# Patient Record
Sex: MALE | Race: WHITE | NOT HISPANIC OR LATINO | Employment: OTHER | URBAN - METROPOLITAN AREA
[De-identification: names, ages, dates, MRNs, and addresses within clinical notes are randomized per-mention and may not be internally consistent; named-entity substitution may affect disease eponyms.]

---

## 2017-02-24 ENCOUNTER — TRANSFERRED RECORDS (OUTPATIENT)
Dept: HEALTH INFORMATION MANAGEMENT | Facility: CLINIC | Age: 77
End: 2017-02-24

## 2017-04-05 ENCOUNTER — AMBULATORY - GICH (OUTPATIENT)
Dept: RADIOLOGY | Facility: OTHER | Age: 77
End: 2017-04-05

## 2017-04-05 DIAGNOSIS — I48.91 ATRIAL FIBRILLATION (H): ICD-10-CM

## 2017-04-12 ENCOUNTER — MEDICAL CORRESPONDENCE (OUTPATIENT)
Facility: CLINIC | Age: 77
End: 2017-04-12
Payer: COMMERCIAL

## 2017-04-12 ENCOUNTER — HOSPITAL ENCOUNTER (OUTPATIENT)
Dept: RADIOLOGY | Facility: OTHER | Age: 77
End: 2017-04-12

## 2017-04-12 ENCOUNTER — TRANSFERRED RECORDS (OUTPATIENT)
Dept: HEALTH INFORMATION MANAGEMENT | Facility: CLINIC | Age: 77
End: 2017-04-12

## 2017-04-12 DIAGNOSIS — I48.91 ATRIAL FIBRILLATION (H): ICD-10-CM

## 2017-04-12 PROCEDURE — 93306 TTE W/DOPPLER COMPLETE: CPT | Mod: 26 | Performed by: INTERNAL MEDICINE

## 2018-01-25 ENCOUNTER — DOCUMENTATION ONLY (OUTPATIENT)
Dept: FAMILY MEDICINE | Facility: OTHER | Age: 78
End: 2018-01-25

## 2018-01-25 PROBLEM — K57.30 DIVERTICULOSIS OF LARGE INTESTINE: Status: ACTIVE | Noted: 2018-01-25

## 2018-02-20 ENCOUNTER — TRANSFERRED RECORDS (OUTPATIENT)
Dept: HEALTH INFORMATION MANAGEMENT | Facility: CLINIC | Age: 78
End: 2018-02-20

## 2018-05-07 ENCOUNTER — TRANSFERRED RECORDS (OUTPATIENT)
Dept: HEALTH INFORMATION MANAGEMENT | Facility: OTHER | Age: 78
End: 2018-05-07

## 2018-05-11 ENCOUNTER — TRANSFERRED RECORDS (OUTPATIENT)
Dept: HEALTH INFORMATION MANAGEMENT | Facility: CLINIC | Age: 78
End: 2018-05-11

## 2018-07-18 DIAGNOSIS — H66.90 AOM (ACUTE OTITIS MEDIA): Primary | ICD-10-CM

## 2018-07-31 ENCOUNTER — TRANSFERRED RECORDS (OUTPATIENT)
Dept: HEALTH INFORMATION MANAGEMENT | Facility: CLINIC | Age: 78
End: 2018-07-31

## 2018-07-31 RX ORDER — FLUTICASONE PROPIONATE 50 MCG
1 SPRAY, SUSPENSION (ML) NASAL 2 TIMES DAILY
COMMUNITY
End: 2018-08-13

## 2018-07-31 RX ORDER — TRIAMCINOLONE ACETONIDE 1 MG/G
CREAM TOPICAL 2 TIMES DAILY PRN
COMMUNITY

## 2018-07-31 RX ORDER — ECHINACEA PURPUREA EXTRACT 125 MG
1 TABLET ORAL PRN
COMMUNITY

## 2018-07-31 RX ORDER — MULTIPLE VITAMINS W/ MINERALS TAB 9MG-400MCG
1 TAB ORAL DAILY
COMMUNITY

## 2018-08-13 ENCOUNTER — OFFICE VISIT (OUTPATIENT)
Dept: AUDIOLOGY | Facility: OTHER | Age: 78
End: 2018-08-13
Attending: AUDIOLOGIST
Payer: MEDICARE

## 2018-08-13 ENCOUNTER — OFFICE VISIT (OUTPATIENT)
Dept: OTOLARYNGOLOGY | Facility: OTHER | Age: 78
End: 2018-08-13
Attending: AUDIOLOGIST
Payer: MEDICARE

## 2018-08-13 VITALS
DIASTOLIC BLOOD PRESSURE: 64 MMHG | WEIGHT: 204 LBS | OXYGEN SATURATION: 99 % | BODY MASS INDEX: 27.04 KG/M2 | TEMPERATURE: 97.4 F | HEART RATE: 55 BPM | HEIGHT: 73 IN | SYSTOLIC BLOOD PRESSURE: 132 MMHG

## 2018-08-13 DIAGNOSIS — J34.3 NASAL TURBINATE HYPERTROPHY: Primary | ICD-10-CM

## 2018-08-13 DIAGNOSIS — H90.3 SENSORINEURAL HEARING LOSS, BILATERAL: Primary | ICD-10-CM

## 2018-08-13 DIAGNOSIS — H90.3 ASNHL (ASYMMETRICAL SENSORINEURAL HEARING LOSS): ICD-10-CM

## 2018-08-13 DIAGNOSIS — R09.81 NASAL CONGESTION: ICD-10-CM

## 2018-08-13 DIAGNOSIS — J34.89 SINUS MUCOSAL THICKENING: ICD-10-CM

## 2018-08-13 DIAGNOSIS — M54.2 NECK PAIN: ICD-10-CM

## 2018-08-13 PROCEDURE — 92567 TYMPANOMETRY: CPT | Performed by: AUDIOLOGIST

## 2018-08-13 PROCEDURE — G0463 HOSPITAL OUTPT CLINIC VISIT: HCPCS | Mod: 25

## 2018-08-13 PROCEDURE — 31231 NASAL ENDOSCOPY DX: CPT | Performed by: NURSE PRACTITIONER

## 2018-08-13 PROCEDURE — 99203 OFFICE O/P NEW LOW 30 MIN: CPT | Mod: 25 | Performed by: NURSE PRACTITIONER

## 2018-08-13 PROCEDURE — 92557 COMPREHENSIVE HEARING TEST: CPT | Performed by: AUDIOLOGIST

## 2018-08-13 PROCEDURE — 92504 EAR MICROSCOPY EXAMINATION: CPT | Performed by: NURSE PRACTITIONER

## 2018-08-13 RX ORDER — FLUTICASONE PROPIONATE 50 MCG
1 SPRAY, SUSPENSION (ML) NASAL 2 TIMES DAILY
Qty: 1 BOTTLE | Refills: 11 | Status: SHIPPED | OUTPATIENT
Start: 2018-08-13 | End: 2023-11-17

## 2018-08-13 ASSESSMENT — PAIN SCALES - GENERAL: PAINLEVEL: NO PAIN (0)

## 2018-08-13 NOTE — PATIENT INSTRUCTIONS
Complete MRI, physical therapy for neck pain. Use Flonase and Milton Med as directed.  Use high volume milton med saline irrigation.  Use warm distilled water and 2 packets of the salt solution that comes with the bottle, dissolve in bottle up to 240 ml henry.  Irrigate each side of your nose leaning over the sink, using 1/3 to 1/2 the volume of the bottle in each nostril every irrigation.  Irrigate 2-3 times daily and as needed.    Follow up with ENT in 6 to 8 weeks.  Thank you for allowing Barbara Lancaster CNP and our ENT team to participate in your care.  If your medications are too expensive, please give the nurse a call.  We can possibly change this medication.  If you have a scheduling or an appointment question please contact [facility] Unit Coordinator at their direct line [phone].   ALL nursing questions or concerns can be directed to your ENT nurse at: [phone] - Jeff

## 2018-08-13 NOTE — LETTER
8/13/2018         RE: Po Blanton  [address] [facility] [address]        Dear Colleague,    Thank you for referring your patient, Po Blanton, to the [facility]. Please see a copy of my visit note below.    Otolaryngology Note         Chief Complaint:     Patient presents with:  Consult: recurrent OM- T. Elida          History of Present Illness:     Po Blanton is a 78 year old male seen today for concerns regarding recent left ear infections.     Developed left ear infection in February following a cold, treated with 3 different oral and otic. Left ear culture was negative. CT Temporal bone was obtained 6/11/17 due to recurrent acute otitis/ear pressure, findings noted of no necrotizing OE, partial left mastoid effusion without findings of complicated mastoiditis, chronic left maxillary sinus opacification. Provider at the time consulted with Dr. Dacosta, ENT, and he recommended treating with Augmentin BID x 10 days, flonase and nasal rinses. Since this occurred, he is concerned about posterior left ear pain, pointing to left posterior neck, below the occipital bone, not at the mastoid cavity. No further otalgia or otorrhea. No hearing loss or fluctuating hearing loss. No ear pressure or aural fullness. Denies tinnitus. Denies vertigo. No facial paresthesias or dysphagia.     No COM or otologic surgeries.   No history of past sinus surgery.     He does report intermittent nasal congestion with no chronic sinusitis. No sinus pain/pressure. Not using anything for his symptoms. Mild intermittent seasonal allergies Not using anything for his symptoms.     Audiogram 8/13/18: Tympanograms Type A Bilaterally. Thresholds show slight to severe ASNHL. SRT = PTA. SRT 30 dB HL. % right and 96% left.          Medications:     Current Outpatient Rx   Medication Sig Dispense Refill     APIXABAN PO Take 5 mg by mouth 2 times daily       multivitamin, therapeutic with  "minerals (THERA-VIT-M) TABS tablet Take 1 tablet by mouth daily       triamcinolone (KENALOG) 0.1 % cream Apply topically 2 times daily as needed for irritation       fluticasone (FLONASE) 50 MCG/ACT spray Spray 1 spray into both nostrils 2 times daily       sodium chloride (OCEAN) 0.65 % nasal spray Spray 1 spray into both nostrils as needed for congestion              Allergies:     Allergies: Review of patient's allergies indicates no known allergies.          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     Esophageal reflux     No Comments Provided     Hypertrophy of prostate without urinary obstruction and other lower urinary tract symptoms (LUTS)     PSA 3.04 10/00;  3.4 03/05; 5.5 04/08.            Past Surgical History:     Past Surgical History:   Procedure Laterality Date     BIOPSY      biopsy of prostate- needle/punch     COLONOSCOPY  05/07/2018    Dr. Humphreys, [facility]     GENITOURINARY SURGERY  01/06/2012    TURP     GENITOURINARY SURGERY  2008    cystoscopy     OTHER SURGICAL HISTORY      2000,190051,([place] FACILITY COLORECTAL CA SCREEN FLEX SCOPE       ENT family history reviewed         Social History:     Social History   Substance Use Topics     Smoking status: Never Smoker     Smokeless tobacco: Never Used     Alcohol use Not on file            Review of Systems:     ROS: See HPI         Physical Exam:     /64 (BP Location: Right arm, Patient Position: Chair, Cuff Size: Adult Regular)  Pulse 55  Temp 97.4  F (36.3  C) (Tympanic)  Ht 6' 1\" (1.854 m)  Wt 204 lb (92.5 kg)  SpO2 99%  BMI 26.91 kg/m2  General - The patient is well nourished and well developed, and appears to have good nutritional status.  Alert and oriented to person and place, answers questions and cooperates with examination appropriately.   Head and Face - Normocephalic and " atraumatic, with no gross asymmetry noted.  The facial nerve is intact, with strong symmetric movements.  Voice and Breathing - The patient was breathing comfortably without the use of accessory muscles. There was no wheezing, stridor. The patients voice was clear and strong, and had appropriate pitch and quality.  Ears - External ear normal. No mastoid tenderness. The ears were examined under microscopy bilaterally.  The right and left external auditory canals are patent, the tympanic membranes are intact without effusion or worrisome retractions.  Normal bony landmarks are appreciated.   Eyes - Extraocular movements intact, and the pupils were reactive to light. Sclera were not icteric or injected, conjunctiva were pink and moist.  Mouth - Examination of the oral cavity showed pink, healthy oral mucosa. Dentition in good condition. No lesions or ulcerations noted. The tongue was mobile and midline.   Throat - The walls of the oropharynx were smooth, pink, moist, symmetric, and had no lesions or ulcerations.  The tonsillar pillars and soft palate were symmetric. The uvula was midline on elevation.    Neck - Normal midline excursion of the laryngotracheal complex during swallowing.  Full range of motion on passive movement.  Palpation of the occipital, submental, submandibular, internal jugular chain, and supraclavicular nodes did not demonstrate any abnormal lymph nodes or masses.  Palpation of the thyroid was soft and smooth, with no nodules or goiter appreciated.  The trachea was mobile and midline.  Tenderness left posterior neck. Full ROM in neck.  Nose - External contour is symmetric, no gross deflection or scars.  See below.    To further evaluate the nasal cavity, I performed rigid nasal endoscopy.  I first sprayed the nasal cavity bilaterally with a mix of lidocaine and neosynephrine.  I used a 2.7mm,   30 degree rigid nasal endoscope for examination.    Left side:    Inferior turbinate hypertrophy  The  left middle turbinate and middle meatus were clearly visualized and normal in appearance.  Going further back, the sphenoethmoid and frontal recess were normal in appearance.  The nasopharynx was unremarkable, and the eustachian tube opening on this side was unobstructed.  Clear secretions in naris with no purulence or polyps.     Right side:    Inferior turbinate hypertrophy  The right middle turbinate and middle meatus were clearly visualized and normal in appearance.  Going further back, the sphenoethmoid and frontal recess was normal in appearance.  The nasopharynx was unremarkable, and the eustachian tube opening on this side was unobstructed.  Clear secretions in naris with no purulence or polyps.     Slight right DNS normal appearing nasal mucosa.    1) bilateral ITH  2) clear secretions bilateral nares  3) mild right DNS         Assessment and Plan:       ICD-10-CM    1. Nasal turbinate hypertrophy J34.3 fluticasone (FLONASE) 50 MCG/ACT spray   2. Nasal congestion R09.81 fluticasone (FLONASE) 50 MCG/ACT spray   3. ASNHL (asymmetrical sensorineural hearing loss) H90.5 MR Internal Auditory Canal wo&w Contrast   4. Neck pain M54.2      Reassured normal ear examination. No further ear symptoms.   Will obtain MRI of IAC given ASNHL. Will call with results once available.  He is not interested in hearing aides.    Restart daily Flonase and BID/PRN Jerome Med sinus irrigation.    Referral to PT for left neck pain.    Follow up in 6-8 weeks for re-evaluation, sooner as needed.     Barbara Lancaster NP  ENT  [facility]  [phone]      Again, thank you for allowing me to participate in the care of your patient.        Sincerely,        aBrbara Lancaster, BEN CNP

## 2018-08-13 NOTE — PROGRESS NOTES
Audiology Evaluation Completed. Please refer SCANNED AUDIOGRAM and/or TYMPANOGRAM for BACKGROUND, RESULTS, RECOMMENDATIONS.    UNDER RECOMMENDATIONS ON AUDIOGRAM PATIENT REFERRED TO ENT WITH SYMPTOMS      Lakeshia KABA, [facility]  Audiologist #[id number]        NO EPIC REFERRAL/ORDER NEEDED TO ENT BY AUDIOLOGY AS PATIENT ALREADY HAS AN APPOINTMENT WITH ENT

## 2018-08-13 NOTE — PROGRESS NOTES
Otolaryngology Note         Chief Complaint:     Patient presents with:  Consult: recurrent OM- TChicho Marrero          History of Present Illness:     Po Blanton is a 78 year old male seen today for concerns regarding recent left ear infections.     Developed left ear infection in February following a cold, treated with 3 different oral and otic. Left ear culture was negative. CT Temporal bone was obtained 6/11/17 due to recurrent acute otitis/ear pressure, findings noted of no necrotizing OE, partial left mastoid effusion without findings of complicated mastoiditis, chronic left maxillary sinus opacification. Provider at the time consulted with Dr. Dacosta, ENT, and he recommended treating with Augmentin BID x 10 days, flonase and nasal rinses. Since this occurred, he is concerned about posterior left ear pain, pointing to left posterior neck, below the occipital bone, not at the mastoid cavity. No further otalgia or otorrhea. No hearing loss or fluctuating hearing loss. No ear pressure or aural fullness. Denies tinnitus. Denies vertigo. No facial paresthesias or dysphagia.     No COM or otologic surgeries.   No history of past sinus surgery.     He does report intermittent nasal congestion with no chronic sinusitis. No sinus pain/pressure. Not using anything for his symptoms. Mild intermittent seasonal allergies Not using anything for his symptoms.     Audiogram 8/13/18: Tympanograms Type A Bilaterally. Thresholds show slight to severe ASNHL. SRT = PTA. SRT 30 dB HL. % right and 96% left.          Medications:     Current Outpatient Rx   Medication Sig Dispense Refill     APIXABAN PO Take 5 mg by mouth 2 times daily       multivitamin, therapeutic with minerals (THERA-VIT-M) TABS tablet Take 1 tablet by mouth daily       triamcinolone (KENALOG) 0.1 % cream Apply topically 2 times daily as needed for irritation       fluticasone (FLONASE) 50 MCG/ACT spray Spray 1 spray into both nostrils 2 times daily    "    sodium chloride (OCEAN) 0.65 % nasal spray Spray 1 spray into both nostrils as needed for congestion              Allergies:     Allergies: Review of patient's allergies indicates no known allergies.          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     Esophageal reflux     No Comments Provided     Hypertrophy of prostate without urinary obstruction and other lower urinary tract symptoms (LUTS)     PSA 3.04 10/00;  3.4 03/05; 5.5 04/08.            Past Surgical History:     Past Surgical History:   Procedure Laterality Date     BIOPSY      biopsy of prostate- needle/punch     COLONOSCOPY  05/07/2018    Dr. Humphreys, [facility]     GENITOURINARY SURGERY  01/06/2012    TURP     GENITOURINARY SURGERY  2008    cystoscopy     OTHER SURGICAL HISTORY      2000,190051,([place] FACILITY COLORECTAL CA SCREEN FLEX SCOPE       ENT family history reviewed         Social History:     Social History   Substance Use Topics     Smoking status: Never Smoker     Smokeless tobacco: Never Used     Alcohol use Not on file            Review of Systems:     ROS: See HPI         Physical Exam:     /64 (BP Location: Right arm, Patient Position: Chair, Cuff Size: Adult Regular)  Pulse 55  Temp 97.4  F (36.3  C) (Tympanic)  Ht 6' 1\" (1.854 m)  Wt 204 lb (92.5 kg)  SpO2 99%  BMI 26.91 kg/m2  General - The patient is well nourished and well developed, and appears to have good nutritional status.  Alert and oriented to person and place, answers questions and cooperates with examination appropriately.   Head and Face - Normocephalic and atraumatic, with no gross asymmetry noted.  The facial nerve is intact, with strong symmetric movements.  Voice and Breathing - The patient was breathing comfortably without the use of accessory muscles. There was no wheezing, stridor. The patients voice was clear " and strong, and had appropriate pitch and quality.  Ears - External ear normal. No mastoid tenderness. The ears were examined under microscopy bilaterally.  The right and left external auditory canals are patent, the tympanic membranes are intact without effusion or worrisome retractions.  Normal bony landmarks are appreciated.   Eyes - Extraocular movements intact, and the pupils were reactive to light. Sclera were not icteric or injected, conjunctiva were pink and moist.  Mouth - Examination of the oral cavity showed pink, healthy oral mucosa. Dentition in good condition. No lesions or ulcerations noted. The tongue was mobile and midline.   Throat - The walls of the oropharynx were smooth, pink, moist, symmetric, and had no lesions or ulcerations.  The tonsillar pillars and soft palate were symmetric. The uvula was midline on elevation.    Neck - Normal midline excursion of the laryngotracheal complex during swallowing.  Full range of motion on passive movement.  Palpation of the occipital, submental, submandibular, internal jugular chain, and supraclavicular nodes did not demonstrate any abnormal lymph nodes or masses.  Palpation of the thyroid was soft and smooth, with no nodules or goiter appreciated.  The trachea was mobile and midline.  Tenderness left posterior neck. Full ROM in neck.  Nose - External contour is symmetric, no gross deflection or scars.  See below.    To further evaluate the nasal cavity, I performed rigid nasal endoscopy.  I first sprayed the nasal cavity bilaterally with a mix of lidocaine and neosynephrine.  I used a 2.7mm,   30 degree rigid nasal endoscope for examination.    Left side:    Inferior turbinate hypertrophy  The left middle turbinate and middle meatus were clearly visualized and normal in appearance.  Going further back, the sphenoethmoid and frontal recess were normal in appearance.  The nasopharynx was unremarkable, and the eustachian tube opening on this side was  unobstructed.  Clear secretions in naris with no purulence or polyps.     Right side:    Inferior turbinate hypertrophy  The right middle turbinate and middle meatus were clearly visualized and normal in appearance.  Going further back, the sphenoethmoid and frontal recess was normal in appearance.  The nasopharynx was unremarkable, and the eustachian tube opening on this side was unobstructed.  Clear secretions in naris with no purulence or polyps.     Slight right DNS normal appearing nasal mucosa.    1) bilateral ITH  2) clear secretions bilateral nares  3) mild right DNS         Assessment and Plan:       ICD-10-CM    1. Nasal turbinate hypertrophy J34.3 fluticasone (FLONASE) 50 MCG/ACT spray   2. Nasal congestion R09.81 fluticasone (FLONASE) 50 MCG/ACT spray   3. ASNHL (asymmetrical sensorineural hearing loss) H90.5 MR Internal Auditory Canal wo&w Contrast   4. Neck pain M54.2      Reassured normal ear examination. No further ear symptoms.   Will obtain MRI of IAC given ASNHL. Will call with results once available.  He is not interested in hearing aides.    Restart daily Flonase and BID/PRN Jerome Med sinus irrigation.    Referral to PT for left neck pain.    Follow up in 6-8 weeks for re-evaluation, sooner as needed.     Barbara Lancaster NP  ENT  [facility]  [phone]

## 2018-08-13 NOTE — NURSING NOTE
"Chief Complaint   Patient presents with     Consult     recurrent OM- T. Elida        Initial /64 (BP Location: Right arm, Patient Position: Chair, Cuff Size: Adult Regular)  Pulse 55  Temp 97.4  F (36.3  C) (Tympanic)  Ht 6' 1\" (1.854 m)  Wt 204 lb (92.5 kg)  SpO2 99%  BMI 26.91 kg/m2 Estimated body mass index is 26.91 kg/(m^2) as calculated from the following:    Height as of this encounter: 6' 1\" (1.854 m).    Weight as of this encounter: 204 lb (92.5 kg).  Medication Reconciliation: complete    Donna Rizvi LPN    "

## 2018-08-13 NOTE — MR AVS SNAPSHOT
After Visit Summary   8/13/2018    Po Blanton    MRN: [id number]           Patient Information     Date Of Birth          1940        Visit Information        Provider Department      8/13/2018 8:45 AM Barbara Lancaster APRN CNP [facility]        Today's Diagnoses     Nasal turbinate hypertrophy    -  1    Nasal congestion        ASNHL (asymmetrical sensorineural hearing loss)        Cervicalgia          Care Instructions    Complete MRI, physical therapy for neck pain. Use Flonase and Milton Med as directed.  Use high volume milton med saline irrigation.  Use warm distilled water and 2 packets of the salt solution that comes with the bottle, dissolve in bottle up to 240 ml henry.  Irrigate each side of your nose leaning over the sink, using 1/3 to 1/2 the volume of the bottle in each nostril every irrigation.  Irrigate 2-3 times daily and as needed.    Follow up with ENT in 6 to 8 weeks.  Thank you for allowing Barbara Lancaster CNP and our ENT team to participate in your care.  If your medications are too expensive, please give the nurse a call.  We can possibly change this medication.  If you have a scheduling or an appointment question please contact [facility] Unit Coordinator at their direct line [phone].   ALL nursing questions or concerns can be directed to your ENT nurse at: [phone] - alex            Follow-ups after your visit        Who to contact     If you have questions or need follow up information about today's clinic visit or your schedule please contact [facility] directly at [phone].  Normal or non-critical lab and imaging results will be communicated to you by MyChart, letter or phone within 4 business days after the clinic has received the results. If you do not hear from us within 7 days, please contact the clinic through MyChart or phone. If you have a critical or abnormal lab result, we will notify you by phone as soon as  "possible.  Submit refill requests through [employer] or call your pharmacy and they will forward the refill request to us. Please allow 3 business days for your refill to be completed.          Additional Information About Your Visit        Care EveryWhere ID     This is your Care EveryWhere ID. This could be used by other organizations to access your [facility] medical records  [id number]        Your Vitals Were     Pulse Temperature Height Pulse Oximetry BMI (Body Mass Index)       55 97.4  F (36.3  C) (Tympanic) 6' 1\" (1.854 m) 99% 26.91 kg/m2        Blood Pressure from Last 3 Encounters:   08/13/18 132/64    Weight from Last 3 Encounters:   08/13/18 204 lb (92.5 kg)              Today, you had the following     No orders found for display         Where to get your medicines      These medications were sent to [id number] - [place], [place] - [address]  [address], [facility] [address]     Phone:  [phone]     fluticasone 50 MCG/ACT spray          Primary Care Provider Office Phone # Fax #    Kumar GIBBS MD Carlos [phone]       [address]  [facility] [address]        Equal Access to Services     BRYON SARGENT AH: Hadii aad ku hadasho Soomaali, waaxda luqadaha, qaybta kaalmada adeegyada, waxay jorgein hayrodneyn merle witt. So [facility] [phone].    ATENCIÓN: Si habla español, tiene a duarte disposición servicios gratuitos de asistencia lingüística. Llame al [phone].    We comply with applicable federal civil rights laws and [place] laws. We do not discriminate on the basis of race, color, national origin, age, disability, sex, sexual orientation, or gender identity.            Thank you!     Thank you for choosing [facility]  for your care. Our goal is always to provide you with excellent care. Hearing back from our patients is one way we can continue to improve our services. Please take a few minutes to complete " the written survey that you may receive in the mail after your visit with us. Thank you!             Your Updated Medication List - Protect others around you: Learn how to safely use, store and throw away your medicines at www.disposemOpenCloviseds.org.          This list is accurate as of 8/13/18  9:19 AM.  Always use your most recent med list.                   Brand Name Dispense Instructions for use Diagnosis    APIXABAN PO      Take 5 mg by mouth 2 times daily        fluticasone 50 MCG/ACT spray    FLONASE    1 Bottle    Spray 1 spray into both nostrils 2 times daily    Nasal turbinate hypertrophy, Nasal congestion       multivitamin, therapeutic with minerals Tabs tablet      Take 1 tablet by mouth daily        sodium chloride 0.65 % nasal spray    OCEAN     Spray 1 spray into both nostrils as needed for congestion        triamcinolone 0.1 % cream    KENALOG     Apply topically 2 times daily as needed for irritation

## 2018-08-13 NOTE — MR AVS SNAPSHOT
After Visit Summary   8/13/2018    Po Blanton    MRN: [id number]           Patient Information     Date Of Birth          1940        Visit Information        Provider Department      8/13/2018 8:15 AM Lakeshia Franco AuD [facility]        Today's Diagnoses     Sensorineural hearing loss, bilateral    -  1       Follow-ups after your visit        Your next 10 appointments already scheduled     Aug 13, 2018  8:45 AM CDT   (Arrive by 8:30 AM)   New Visit with BEN Ceron CNP   [facility] [place] ([facility] [place] )    [address]  [place] [address]   [phone]              Who to contact     If you have questions or need follow up information about today's clinic visit or your schedule please contact [facility] directly at [phone].  Normal or non-critical lab and imaging results will be communicated to you by MyChart, letter or phone within 4 business days after the clinic has received the results. If you do not hear from us within 7 days, please contact the clinic through MyChart or phone. If you have a critical or abnormal lab result, we will notify you by phone as soon as possible.  Submit refill requests through [email or web address] or call your pharmacy and they will forward the refill request to us. Please allow 3 business days for your refill to be completed.          Additional Information About Your Visit        Care EveryWhere ID     This is your Care EveryWhere ID. This could be used by other organizations to access your [place] medical records  [id number]         Blood Pressure from Last 3 Encounters:   No data found for BP    Weight from Last 3 Encounters:   No data found for Wt              We Performed the Following     AUDIOGRAM/TYMPANOGRAM - INTERFACE        Primary Care Provider Office Phone # Fax #    Kumar Gonzalez [phone] [address] [facility] [address]         Equal Access to Services     [facility] : Hadii aad ku hadvaniasara Dove, waamilcarda luqadaha, qaybta thomasjeffryney tang. So [facility] [phone].    ATENCIÓN: Si habla español, tiene a duarte disposición servicios gratuitos de asistencia lingüística. Llame al [phone].    We comply with applicable federal civil rights laws and [place] laws. We do not discriminate on the basis of race, color, national origin, age, disability, sex, sexual orientation, or gender identity.            Thank you!     Thank you for choosing [facility]  for your care. Our goal is always to provide you with excellent care. Hearing back from our patients is one way we can continue to improve our services. Please take a few minutes to complete the written survey that you may receive in the mail after your visit with us. Thank you!             Your Updated Medication List - Protect others around you: Learn how to safely use, store and throw away your medicines at www.disposemymeds.org.          This list is accurate as of 8/13/18  8:33 AM.  Always use your most recent med list.                   Brand Name Dispense Instructions for use Diagnosis    APIXABAN PO      Take 5 mg by mouth 2 times daily        fluticasone 50 MCG/ACT spray    FLONASE     Spray 1 spray into both nostrils 2 times daily        multivitamin, therapeutic with minerals Tabs tablet      Take 1 tablet by mouth daily        sodium chloride 0.65 % nasal spray    OCEAN     Spray 1 spray into both nostrils as needed for congestion        triamcinolone 0.1 % cream    KENALOG     Apply topically 2 times daily as needed for irritation

## 2018-08-17 ENCOUNTER — TRANSFERRED RECORDS (OUTPATIENT)
Dept: HEALTH INFORMATION MANAGEMENT | Facility: CLINIC | Age: 78
End: 2018-08-17

## 2018-08-17 LAB — CREAT SERPL-MCNC: 1.11 MG/DL (ref 0.7–1.2)

## 2018-08-21 ENCOUNTER — TELEPHONE (OUTPATIENT)
Dept: OTOLARYNGOLOGY | Facility: OTHER | Age: 78
End: 2018-08-21

## 2018-08-21 NOTE — TELEPHONE ENCOUNTER
Left message for pt to call back for results.    Per tad MRI IAC showed no IAC mass or lesion, thickening of left maxillary sinus. Pt is advised to keep follow up appt.    Donna Rizvi LPN

## 2018-08-22 ENCOUNTER — HOSPITAL ENCOUNTER (OUTPATIENT)
Dept: PHYSICAL THERAPY | Facility: OTHER | Age: 78
Setting detail: THERAPIES SERIES
End: 2018-08-22
Attending: NURSE PRACTITIONER
Payer: MEDICARE

## 2018-08-22 PROCEDURE — G8982 BODY POS GOAL STATUS: HCPCS | Mod: GP,CJ,CI

## 2018-08-22 PROCEDURE — 97110 THERAPEUTIC EXERCISES: CPT | Mod: GP

## 2018-08-22 PROCEDURE — 40000185 ZZHC STATISTIC PT OUTPT VISIT

## 2018-08-22 PROCEDURE — 97162 PT EVAL MOD COMPLEX 30 MIN: CPT | Mod: GP

## 2018-08-22 PROCEDURE — 97140 MANUAL THERAPY 1/> REGIONS: CPT | Mod: GP

## 2018-08-22 PROCEDURE — G8981 BODY POS CURRENT STATUS: HCPCS | Mod: GP,CJ

## 2018-08-22 NOTE — PROGRESS NOTES
[facility]          OUTPATIENT PHYSICAL THERAPY ORTHOPEDIC EVALUATION  PLAN OF TREATMENT FOR OUTPATIENT REHABILITATION  (COMPLETE FOR INITIAL CLAIMS ONLY)  Patient's Last Name, First Name, M.I.  YOB: 1940  HarvinderPo COTTRELL    Provider s Name:  [facility]   Medical Record No.  [id number]   Start of Care Date:  08/22/18   Onset Date:  02/14/18   Type:     _X__PT   ___OT   ___SLP Medical Diagnosis:  Neck Pain     PT Diagnosis:  Cervical and thoracic segmental dysfunctions and myofascial and dural tightness   Visits from SOC:  1      _________________________________________________________________________________  Plan of Treatment/Functional Goals:  joint mobilization, manual therapy, neuromuscular re-education, ROM, stretching, strengthening           Goals  Goal Identifier: Driving  Goal Description: Pt will tolerate turning head to the L without increasing pain > 0-2/10  Target Date: 11/20/18    Goal Identifier: Headaches  Goal Description: Pt will resolve headaches  Target Date: 11/20/18    Goal Identifier: Outdoor's Work  Goal Description: Pt will tolerate working on ATV trails and cutting wood without increasing pain> 0-2/10  Target Date: 11/20/18                                                           Therapy Frequency:  2 times/Week  Predicted Duration of Therapy Intervention:  12 weeks    Petra Kahn, PT                 I CERTIFY THE NEED FOR THESE SERVICES FURNISHED UNDER        THIS PLAN OF TREATMENT AND WHILE UNDER MY CARE     (Physician co-signature of this document indicates review and certification of the therapy plan).                       Certification Date From:  08/22/18   Certification Date To:  11/20/18    Referring Provider:  BEN More CNP    Initial Assessment        See Epic Evaluation Start of Care Date: 08/22/18

## 2018-08-28 ENCOUNTER — HOSPITAL ENCOUNTER (OUTPATIENT)
Dept: PHYSICAL THERAPY | Facility: OTHER | Age: 78
Setting detail: THERAPIES SERIES
End: 2018-08-28
Attending: NURSE PRACTITIONER
Payer: MEDICARE

## 2018-08-28 PROCEDURE — 97140 MANUAL THERAPY 1/> REGIONS: CPT | Mod: GP

## 2018-08-28 PROCEDURE — 97110 THERAPEUTIC EXERCISES: CPT | Mod: GP

## 2018-08-28 PROCEDURE — 40000185 ZZHC STATISTIC PT OUTPT VISIT

## 2018-09-04 ENCOUNTER — HOSPITAL ENCOUNTER (OUTPATIENT)
Dept: PHYSICAL THERAPY | Facility: OTHER | Age: 78
Setting detail: THERAPIES SERIES
End: 2018-09-04
Attending: NURSE PRACTITIONER
Payer: MEDICARE

## 2018-09-04 PROCEDURE — 40000185 ZZHC STATISTIC PT OUTPT VISIT

## 2018-09-04 PROCEDURE — 97140 MANUAL THERAPY 1/> REGIONS: CPT | Mod: GP

## 2018-09-04 PROCEDURE — 97110 THERAPEUTIC EXERCISES: CPT | Mod: GP

## 2018-09-07 ENCOUNTER — HOSPITAL ENCOUNTER (OUTPATIENT)
Dept: PHYSICAL THERAPY | Facility: OTHER | Age: 78
Setting detail: THERAPIES SERIES
End: 2018-09-07
Attending: NURSE PRACTITIONER
Payer: MEDICARE

## 2018-09-07 PROCEDURE — 97110 THERAPEUTIC EXERCISES: CPT | Mod: GP

## 2018-09-07 PROCEDURE — 97140 MANUAL THERAPY 1/> REGIONS: CPT | Mod: GP

## 2018-09-07 PROCEDURE — 40000185 ZZHC STATISTIC PT OUTPT VISIT

## 2018-09-11 ENCOUNTER — HOSPITAL ENCOUNTER (OUTPATIENT)
Dept: PHYSICAL THERAPY | Facility: OTHER | Age: 78
Setting detail: THERAPIES SERIES
End: 2018-09-11
Attending: NURSE PRACTITIONER
Payer: MEDICARE

## 2018-09-11 PROCEDURE — 97140 MANUAL THERAPY 1/> REGIONS: CPT | Mod: GP

## 2018-09-11 PROCEDURE — 40000185 ZZHC STATISTIC PT OUTPT VISIT

## 2018-09-11 PROCEDURE — 97110 THERAPEUTIC EXERCISES: CPT | Mod: GP

## 2018-09-14 ENCOUNTER — HOSPITAL ENCOUNTER (OUTPATIENT)
Dept: PHYSICAL THERAPY | Facility: OTHER | Age: 78
Setting detail: THERAPIES SERIES
End: 2018-09-14
Attending: NURSE PRACTITIONER
Payer: MEDICARE

## 2018-09-14 PROCEDURE — 40000185 ZZHC STATISTIC PT OUTPT VISIT

## 2018-09-14 PROCEDURE — 97140 MANUAL THERAPY 1/> REGIONS: CPT | Mod: GP

## 2018-09-14 NOTE — ADDENDUM NOTE
Encounter addended by: Petra Kahn PT on: 9/14/2018  4:31 PM<BR>     Actions taken: Charge Capture section accepted

## 2018-09-19 NOTE — ADDENDUM NOTE
Encounter addended by: Petra Kahn PT on: 9/19/2018  9:37 AM<BR>     Actions taken: Charge Capture section accepted

## 2018-09-21 ENCOUNTER — HOSPITAL ENCOUNTER (OUTPATIENT)
Dept: PHYSICAL THERAPY | Facility: OTHER | Age: 78
Setting detail: THERAPIES SERIES
End: 2018-09-21
Attending: NURSE PRACTITIONER
Payer: MEDICARE

## 2018-09-21 PROCEDURE — 40000185 ZZHC STATISTIC PT OUTPT VISIT

## 2018-09-21 PROCEDURE — 97140 MANUAL THERAPY 1/> REGIONS: CPT | Mod: GP

## 2018-09-21 PROCEDURE — 97110 THERAPEUTIC EXERCISES: CPT | Mod: GP

## 2018-09-25 ENCOUNTER — HOSPITAL ENCOUNTER (OUTPATIENT)
Dept: PHYSICAL THERAPY | Facility: OTHER | Age: 78
Setting detail: THERAPIES SERIES
End: 2018-09-25
Attending: NURSE PRACTITIONER
Payer: MEDICARE

## 2018-09-25 PROCEDURE — 97140 MANUAL THERAPY 1/> REGIONS: CPT | Mod: GP

## 2018-09-25 PROCEDURE — 40000185 ZZHC STATISTIC PT OUTPT VISIT

## 2018-10-08 ENCOUNTER — OFFICE VISIT (OUTPATIENT)
Dept: OTOLARYNGOLOGY | Facility: OTHER | Age: 78
End: 2018-10-08
Attending: NURSE PRACTITIONER
Payer: COMMERCIAL

## 2018-10-08 VITALS
HEART RATE: 58 BPM | SYSTOLIC BLOOD PRESSURE: 120 MMHG | TEMPERATURE: 96.8 F | OXYGEN SATURATION: 95 % | HEIGHT: 73 IN | WEIGHT: 206 LBS | BODY MASS INDEX: 27.3 KG/M2 | DIASTOLIC BLOOD PRESSURE: 62 MMHG

## 2018-10-08 DIAGNOSIS — M54.2 NECK PAIN: ICD-10-CM

## 2018-10-08 DIAGNOSIS — J34.3 NASAL TURBINATE HYPERTROPHY: Primary | ICD-10-CM

## 2018-10-08 DIAGNOSIS — R09.81 NASAL CONGESTION: ICD-10-CM

## 2018-10-08 DIAGNOSIS — H90.3 ASNHL (ASYMMETRICAL SENSORINEURAL HEARING LOSS): ICD-10-CM

## 2018-10-08 PROCEDURE — G0463 HOSPITAL OUTPT CLINIC VISIT: HCPCS

## 2018-10-08 PROCEDURE — 99213 OFFICE O/P EST LOW 20 MIN: CPT | Performed by: NURSE PRACTITIONER

## 2018-10-08 ASSESSMENT — PAIN SCALES - GENERAL: PAINLEVEL: NO PAIN (0)

## 2018-10-08 NOTE — PATIENT INSTRUCTIONS
Thank you for allowing Barbara Lancaster CNP and our ENT team to participate in your care.  If your medications are too expensive, please give the nurse a call.  We can possibly change this medication.  If you have a scheduling or an appointment question please contact [facility] Unit Coordinator at their direct line [phone].   ALL nursing questions or concerns can be directed to your ENT nurse at: [phone]- Jeff    Use Flonase and nasal saline irrigations as needed.  Continue with annual audiograms, sooner with any new hearing concerns.

## 2018-10-08 NOTE — NURSING NOTE
"Chief Complaint   Patient presents with     Sinus Problem     Pt is here for a f/u NTH and nasal congestion.  Pt is doing better.       Initial /62 (BP Location: Right arm, Cuff Size: Adult Large)  Pulse 58  Temp 96.8  F (36  C) (Tympanic)  Ht 6' 1\" (1.854 m)  Wt 206 lb (93.4 kg)  SpO2 95%  BMI 27.18 kg/m2 Estimated body mass index is 27.18 kg/(m^2) as calculated from the following:    Height as of this encounter: 6' 1\" (1.854 m).    Weight as of this encounter: 206 lb (93.4 kg).  Medication Reconciliation: complete    Ginette White LPN    "

## 2018-10-08 NOTE — LETTER
10/8/2018         RE: Po Blanton  [address] [facility] [address]        Dear Colleague,    Thank you for referring your patient, Po Blanton, to the [facility]. Please see a copy of my visit note below.    Otolaryngology Progress Note           Chief Complaint:     Patient presents with:  Sinus Problem: Pt is here for a f/u NTH and nasal congestion.  Pt is doing better.         History of Present Illness:     Po Blanton is a 78 year old male here to f/u on nasal turbinate hypertrophy, nasal congestion, ASNHL, neck pain. I initially saw him 8/13/18 following recurrent left ear infection, treated with 3 oral and otic antibiotics. His ear examination was normal, no effusion, suspected more cervicalgia rather than ear pain, and referred him to PT. I also had him start Flonase and Jerome Med sinus irrigations.     CT Temporal bone 6/11/17 (obtained by outside provider): No necrotizing OE, partial left mastoid effusion without findings of complicated mastoiditis, chronic left maxillary sinus opacification. Provider at the time consulted with Dr. Dacosta, ENT, and he recommended treating with Augmentin BID x 10 days, flonase and nasal rinses.    Audiogram 8/13/18: Tympanograms Type A Bilaterally. Thresholds show slight to severe ASNHL. SRT = PTA. SRT 30 dB HL. % right and 96% left.     Today Po reports that he has one more session of PT for his neck and he will be done. He is not having any further cervicalgia and feels he can move his neck better. Denies otalgia/otorrhea or hearing changes. Denies aural fullness, tinnitus and vertigo.  As far as his sinuses, he used the Flonase and Jerome Med sinus rinses for 1 month and now notes complete resolution of the nasal congestion and PND. He is overall happy with how he is feeling and has no questions/concerns.    MRI IAC 8/17/18: No IAC mass or lesion. Thickening of left maxillary sinus noted.           "Review of Systems:     See HPI         Physical Exam:     /62 (BP Location: Right arm, Cuff Size: Adult Large)  Pulse 58  Temp 96.8  F (36  C) (Tympanic)  Ht 6' 1\" (1.854 m)  Wt 206 lb (93.4 kg)  SpO2 95%  BMI 27.18 kg/m2    General - The patient is well nourished and well developed, and appears to have good nutritional status.  Alert and oriented to person and place, interactive.  Head and Face - Normocephalic and atraumatic, with no gross asymmetry noted of the contour of the facial features.  The facial nerve is intact, with strong symmetric movements.  Neck-no palpable lymphadenopathy or thyroid mass.  Trachea is midline.  Eyes - Extraocular movements intact.   Ears- External ears normal. Canals clear. Right tympanic membrane intact without effusion, worrisome retraction or mass. Left tympanic membrane intact without effusion, worrisome retraction or mass.    Nose - Nasal mucosa is pink and moist with no abnormal mucus. Right DNS. Minimal IT hypertrophy, improved.  No polyps, masses, or purulence noted on examination.  Mouth - Examination of the oral cavity shows pink, healthy, moist mucosa. Dentition in good condition.  No lesions or ulceration noted. The tongue is mobile and midline.    Throat - The walls of the oropharynx were smooth, pink, moist, symmetric, and had no lesions or ulcerations.  The tonsillar pillars and soft palate were symmetric.  The uvula was midline on elevation.           Assessment and Plan:       ICD-10-CM    1. Nasal turbinate hypertrophy J34.3    2. Nasal congestion R09.81    3. ASNHL (asymmetrical sensorineural hearing loss) H90.5    4. Neck pain M54.2     Resolved     Recent MRI IAC (8/17/18)  negative for lesion/mass.    Cervicalgia resolved with PT.    Nasal congestion and PND resolved with 1 month of Flonase and Jerome Med sinus irrigation.    He is happy with how he is feeling.    Recommend to continue PRN Flonase and nasal saline irrigation if symptoms return, follow " up if uncontrolled.    Recommend annual audiograms, sooner with any hearing changes.    He will follow up in ENT as needed.     Barbara Lancaster NP  ENT  [facility] [place]  [phone]      Again, thank you for allowing me to participate in the care of your patient.        Sincerely,        BEN Lucas CNP

## 2018-10-08 NOTE — PROGRESS NOTES
"Otolaryngology Progress Note           Chief Complaint:     Patient presents with:  Sinus Problem: Pt is here for a f/u NTH and nasal congestion.  Pt is doing better.         History of Present Illness:     Po Blanton is a 78 year old male here to f/u on nasal turbinate hypertrophy, nasal congestion, ASNHL, neck pain. I initially saw him 8/13/18 following recurrent left ear infection, treated with 3 oral and otic antibiotics. His ear examination was normal, no effusion, suspected more cervicalgia rather than ear pain, and referred him to PT. I also had him start Flonase and Jerome Med sinus irrigations.     CT Temporal bone 6/11/17 (obtained by outside provider): No necrotizing OE, partial left mastoid effusion without findings of complicated mastoiditis, chronic left maxillary sinus opacification. Provider at the time consulted with Dr. Dacosta, ENT, and he recommended treating with Augmentin BID x 10 days, flonase and nasal rinses.    Audiogram 8/13/18: Tympanograms Type A Bilaterally. Thresholds show slight to severe ASNHL. SRT = PTA. SRT 30 dB HL. % right and 96% left.     Today Po reports that he has one more session of PT for his neck and he will be done. He is not having any further cervicalgia and feels he can move his neck better. Denies otalgia/otorrhea or hearing changes. Denies aural fullness, tinnitus and vertigo.  As far as his sinuses, he used the Flonase and Jerome Med sinus rinses for 1 month and now notes complete resolution of the nasal congestion and PND. He is overall happy with how he is feeling and has no questions/concerns.    MRI IAC 8/17/18: No IAC mass or lesion. Thickening of left maxillary sinus noted.          Review of Systems:     See HPI         Physical Exam:     /62 (BP Location: Right arm, Cuff Size: Adult Large)  Pulse 58  Temp 96.8  F (36  C) (Tympanic)  Ht 6' 1\" (1.854 m)  Wt 206 lb (93.4 kg)  SpO2 95%  BMI 27.18 kg/m2    General - The patient is " well nourished and well developed, and appears to have good nutritional status.  Alert and oriented to person and place, interactive.  Head and Face - Normocephalic and atraumatic, with no gross asymmetry noted of the contour of the facial features.  The facial nerve is intact, with strong symmetric movements.  Neck-no palpable lymphadenopathy or thyroid mass.  Trachea is midline.  Eyes - Extraocular movements intact.   Ears- External ears normal. Canals clear. Right tympanic membrane intact without effusion, worrisome retraction or mass. Left tympanic membrane intact without effusion, worrisome retraction or mass.    Nose - Nasal mucosa is pink and moist with no abnormal mucus. Right DNS. Minimal IT hypertrophy, improved.  No polyps, masses, or purulence noted on examination.  Mouth - Examination of the oral cavity shows pink, healthy, moist mucosa. Dentition in good condition.  No lesions or ulceration noted. The tongue is mobile and midline.    Throat - The walls of the oropharynx were smooth, pink, moist, symmetric, and had no lesions or ulcerations.  The tonsillar pillars and soft palate were symmetric.  The uvula was midline on elevation.           Assessment and Plan:       ICD-10-CM    1. Nasal turbinate hypertrophy J34.3    2. Nasal congestion R09.81    3. ASNHL (asymmetrical sensorineural hearing loss) H90.5    4. Neck pain M54.2     Resolved     Recent MRI IAC (8/17/18)  negative for lesion/mass.    Cervicalgia resolved with PT.    Nasal congestion and PND resolved with 1 month of Flonase and Jerome Med sinus irrigation.    He is happy with how he is feeling.    Recommend to continue PRN Flonase and nasal saline irrigation if symptoms return, follow up if uncontrolled.    Recommend annual audiograms, sooner with any hearing changes.    He will follow up in ENT as needed.     Barbara Lancaster NP  ENT  [facility], [place]  [phone]

## 2018-10-08 NOTE — MR AVS SNAPSHOT
After Visit Summary   10/8/2018    Po Blanton    MRN: [id number]           Patient Information     Date Of Birth          1940        Visit Information        Provider Department      10/8/2018 10:15 AM Barbara Lancaster APRN CNP [facility]        Care Instructions      Thank you for allowing Barbara Lancaster CNP and our ENT team to participate in your care.  If your medications are too expensive, please give the nurse a call.  We can possibly change this medication.  If you have a scheduling or an appointment question please contact [facility] Unit Coordinator at their direct line [phone].   ALL nursing questions or concerns can be directed to your ENT nurse at: [phone]- Jeff    Use Flonase and nasal saline irrigations as needed.  Continue with annual audiograms, sooner with any new hearing concerns.           Follow-ups after your visit        Follow-up notes from your care team     Return if symptoms worsen or fail to improve.      Your next 10 appointments already scheduled     Oct 08, 2018 10:15 AM CDT   (Arrive by 10:00 AM)   [facility] Visit with BEN Ceron CNP   [facility]    [address]  [facility] [address]   [phone]            Oct 09, 2018  9:30 AM CDT   Treatment with Petra Kahn PT   [facility] ([facility])    [address] [facility] [id number] [phone]              Who to contact     If you have questions or need follow up information about today's clinic visit or your schedule please contact [facility] directly at [phone].  Normal or non-critical lab and imaging results will be communicated to you by MyChart, letter or phone within 4 business days after the clinic has received the results. If you do not hear from us within 7 days, please contact the clinic through myTipst  "or phone. If you have a critical or abnormal lab result, we will notify you by phone as soon as possible.  Submit refill requests through [employer] or call your pharmacy and they will forward the refill request to us. Please allow 3 business days for your refill to be completed.          Additional Information About Your Visit        Care EveryWhere ID     This is your Care EveryWhere ID. This could be used by other organizations to access your [place] medical records  [id number]        Your Vitals Were     Pulse Temperature Height Pulse Oximetry BMI (Body Mass Index)       58 96.8  F (36  C) (Tympanic) 6' 1\" (1.854 m) 95% 27.18 kg/m2        Blood Pressure from Last 3 Encounters:   10/08/18 120/62   08/13/18 132/64    Weight from Last 3 Encounters:   10/08/18 206 lb (93.4 kg)   08/13/18 204 lb (92.5 kg)              Today, you had the following     No orders found for display       Primary Care Provider Office Phone # Fax #    Kumar GIBBS [phone]       [address] [facility] [address]        Equal Access to Services     [facility]: Hadii aad ku hadasho Soomaali, waaxda luqadaha, qaybta kaalmada adewellingtonyacara, ney sky . So [facility] [phone].    ATENCIÓN: Si habla español, tiene a duarte disposición servicios gratuitos de asistencia lingüística. [facility] [phone].    We comply with applicable federal civil rights laws and [place] laws. We do not discriminate on the basis of race, color, national origin, age, disability, sex, sexual orientation, or gender identity.            Thank you!     Thank you for choosing [facility]  for your care. Our goal is always to provide you with excellent care. Hearing back from our patients is one way we can continue to improve our services. Please take a few minutes to complete the written survey that you may receive in the mail after your visit with us. Thank you!             Your Updated " Medication List - Protect others around you: Learn how to safely use, store and throw away your medicines at www.disposemymeds.org.          This list is accurate as of 10/8/18  9:50 AM.  Always use your most recent med list.                   Brand Name Dispense Instructions for use Diagnosis    APIXABAN PO      Take 5 mg by mouth daily        fluticasone 50 MCG/ACT spray    FLONASE    1 Bottle    Spray 1 spray into both nostrils 2 times daily    Nasal turbinate hypertrophy, Nasal congestion       multivitamin, therapeutic with minerals Tabs tablet      Take 1 tablet by mouth daily        sodium chloride 0.65 % nasal spray    OCEAN     Spray 1 spray into both nostrils as needed for congestion        triamcinolone 0.1 % cream    KENALOG     Apply topically 2 times daily as needed for irritation

## 2018-10-09 ENCOUNTER — HOSPITAL ENCOUNTER (OUTPATIENT)
Dept: PHYSICAL THERAPY | Facility: OTHER | Age: 78
Setting detail: THERAPIES SERIES
End: 2018-10-09
Attending: NURSE PRACTITIONER
Payer: MEDICARE

## 2018-10-09 PROCEDURE — 40000185 ZZHC STATISTIC PT OUTPT VISIT

## 2018-10-09 PROCEDURE — G8982 BODY POS GOAL STATUS: HCPCS | Mod: GP,CI

## 2018-10-09 PROCEDURE — 97110 THERAPEUTIC EXERCISES: CPT | Mod: GP

## 2018-10-09 PROCEDURE — G8983 BODY POS D/C STATUS: HCPCS | Mod: GP,CI

## 2018-10-09 PROCEDURE — 97140 MANUAL THERAPY 1/> REGIONS: CPT | Mod: GP

## 2018-10-09 NOTE — PROGRESS NOTES
Outpatient Physical Therapy Discharge Note     Patient: Po Blanton  : 1940    Beginning/End Dates of Reporting Period:  18 to 10/9/2018    Referring Provider: BEN More,CNP    Therapy Diagnosis: Segmental cervical restrictions, myofascial and dural tightness     Client Self Report: Pt reports that he is doing well. He states that he went out and mowed the "[employer] trails for four hours yesterday. He states that his mobility is better for driving, sleeping, brushing, and cutting for ATV and [employer] trails. Pt reports no headaches. He states that he does his exercises somewhat,but, not as faithfully as initially.    Objective Measurements:  Objective Measure: General Listening  Details: R posterior: ; Loading: decrease shoulder R/R and R/L  Objective Measure: CROM  Details: Flexion: 70, Extension:58, SB L 40, R 36 ; Rot; L 76, R 70 degrees  Objective Measure: Cervical mobility  Details: Improving C2-3 R Rot-ext, R SB, AP's, and PA's glides  Objective Measure: Flexibility  Details: Improving pectoralis bilaterally and lat dorsi, but, continues to be tight and have restricted bilateral shoulder mobility ( sholder elevation 150 degrees bilaterally), scalenes, and levator scapula  Objective Measure: Thoracic mobility  Details: Improving AP and PA glides in side lying with decrease R > L thoracic rotation, but, continues to be restricted as noted with side lying trunk rotation  Objective Measure: Palpation  Details: Improving anterior cervical fascia and visceral sheath with decrease clavipectoral and thyropericardial fibers, and sternum/claviclals      Outcome Measures (most recent score):  PSFS: 43.3% to 10% ; Neck Disability: 8%    Goals:  Goal Identifier Driving   Goal Description Pt will tolerate turning head to the L without increasing pain > 0-2/10   Target Date 18   Date Met  10/09/18   Progress:     Goal Identifier Headaches   Goal Description Pt will resolve headaches    Target Date 11/20/18   Date Met  10/09/18   Progress:     Goal Identifier Outdoor's Work   Goal Description Pt will tolerate working on ATV trails and cutting wood without increasing pain> 0-2/10   Target Date 11/20/18   Date Met  10/09/18   Progress:     Goal Identifier     Goal Description     Target Date     Date Met      Progress:     Goal Identifier     Goal Description     Target Date     Date Met      Progress:     Goal Identifier     Goal Description     Target Date     Date Met      Progress:     Goal Identifier     Goal Description     Target Date     Date Met      Progress:     Goal Identifier     Goal Description     Target Date     Date Met      Progress:     Progress Toward Goals:   Progress this reporting period: Pt has achieved long term goals          Plan:  Discharge from therapy.    Discharge:    Reason for Discharge: Patient has met all goals.      Discharge Plan: Patient to continue home program.

## 2019-01-14 ENCOUNTER — TRANSFERRED RECORDS (OUTPATIENT)
Dept: HEALTH INFORMATION MANAGEMENT | Facility: OTHER | Age: 79
End: 2019-01-14

## 2019-01-18 ENCOUNTER — MEDICAL CORRESPONDENCE (OUTPATIENT)
Dept: HEALTH INFORMATION MANAGEMENT | Facility: OTHER | Age: 79
End: 2019-01-18

## 2019-01-21 PROBLEM — I48.91 ATRIAL FIBRILLATION, UNSPECIFIED TYPE (H): Status: ACTIVE | Noted: 2017-02-24

## 2019-01-30 ENCOUNTER — OFFICE VISIT (OUTPATIENT)
Dept: UROLOGY | Facility: OTHER | Age: 79
End: 2019-01-30
Attending: UROLOGY
Payer: COMMERCIAL

## 2019-01-30 VITALS — HEART RATE: 64 BPM | BODY MASS INDEX: 27.07 KG/M2 | RESPIRATION RATE: 16 BRPM | WEIGHT: 205.2 LBS

## 2019-01-30 DIAGNOSIS — R31.0 GROSS HEMATURIA: Primary | ICD-10-CM

## 2019-01-30 DIAGNOSIS — C61 MALIGNANT NEOPLASM OF PROSTATE (H): Primary | ICD-10-CM

## 2019-01-30 PROCEDURE — G0463 HOSPITAL OUTPT CLINIC VISIT: HCPCS

## 2019-01-30 PROCEDURE — 99204 OFFICE O/P NEW MOD 45 MIN: CPT | Performed by: UROLOGY

## 2019-01-30 ASSESSMENT — PAIN SCALES - GENERAL: PAINLEVEL: NO PAIN (0)

## 2019-01-30 NOTE — PROGRESS NOTES
I was asked to see this patient by Dr Lu and provide my opinion about the following:  Hematuria    Type of Visit  Consult    Chief Complaint  Hematuria    HPI  Mr. Blanton is a 78 year old male with history of prostate cancer status post EBRT 5 years ago who presents with hematuria.  Gross hematuria started 1 months ago.  It has been intermittent since that time.  He notices gross hematuria daily.  Episode lasted about 1-2 voids and then resolves spontaneously.  Patient denies associated dysuria at the time of onset.  Patient denies clots associated with hematuria.    Hematuria-related signs/symptoms  History of smoking?    No  History of chemotherapy?   No  History of pelvic radiation?   Yes  History of kidney or bladder stones?  No  History of frequent urinary tract infections? 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      Past Medical History  He  has a past medical history of Benign neoplasm of colon, Esophageal reflux, and Hypertrophy of prostate without urinary obstruction and other lower urinary tract symptoms (LUTS).  Patient Active Problem List   Diagnosis     Hypertrophy of prostate with urinary obstruction and other lower urinary tract symptoms (LUTS)     Diverticulosis of large intestine     Elevated prostate specific antigen (PSA)     Hyperlipidemia     Acute prostatitis     Atrial fibrillation, unspecified type (H)     Leg edema, left     Malignant neoplasm of prostate (H)     Routine general medical examination at a health care facility       Past Surgical History  He  has a past surgical history that includes other surgical history; biopsy; Colonoscopy (05/07/2018); Abdomen surgery (01/06/2012); and Abdomen surgery (2008).    Medications  He has a current medication list which includes the following prescription(s): apixaban, fluticasone, multivitamin w/minerals, sodium chloride, and triamcinolone.    Allergies  No Known  Allergies    Social History  He  reports that  has never smoked. he has never used smokeless tobacco. He reports that he drinks alcohol.  No drug abuse.    Family History  Family History   Problem Relation Age of Onset     Prostate Cancer Father      Cerebrovascular Disease Father      Breast Cancer Mother 49        Cancer-breast,     Other - See Comments Other         He had nine siblings-four sisters and two brothers still living.  Three -one from a motor vehicle accident, one from a stroke, one from a crib death.     Other - See Comments Brother         aneurysm       Review of Systems  I personally reviewed the ROS with the patient.    Nursing Notes:   Yuli Millan RN  2019  1:10 PM  Signed  Review of Systems:    Weight loss:    No     Recent fever/chills:  No   Night sweats:   No  Current skin rash:  No   Recent hair loss:  No  Heat intolerance:  No   Cold intolerance:  No  Chest pain:   No   Palpitations:   No  Shortness of breath:  No   Wheezing:   No  Constipation:    No   Diarrhea:   No   Nausea:   No   Vomiting:   No   Kidney/side pain:  No   Back pain:   No  Frequent headaches:  No   Dizziness:     No  Leg swelling:   No   Calf pain:    No    Parents, brothers or sisters with history of kidney cancer:   No  Parents, brothers or sisters with history of bladder cancer: No    Physical Exam  Vitals:    19 1308   Pulse: 64   Resp: 16   Weight: 93.1 kg (205 lb 3.2 oz)     Constitutional: No acute distress.  Alert and cooperative   Head: NCAT  Eyes: Conjunctivae normal  Cardiovascular: Regular rate.  Pulmonary/Chest: Respirations are even and non-labored bilaterally, no audible wheezing  Abdominal: Soft. No distension, tenderness, masses or guarding.   Neurological: A + O x 3.  Cranial Nerves II-XII grossly intact.  Extremities: MANA x 4, Warm. No clubbing.  No cyanosis.    Skin: Pink, warm and dry.  No visible rashes noted.  Psychiatric:  Normal mood and affect  Back:  No left  CVA tenderness.  No right CVA tenderness.  Genitourinary:  Nonpalpable bladder    Labs  Urinalyses from outside records does reveal sterile microhematuria    Records also reveal a PSA from December 2018 of 0.53    Imaging  None    Assessment  Mr. Blanton is a 78 year old male with a history of prostate cancer status post EBRT 5 years ago with presents with sterile gross hematuria    Discussed rationale for work up.  Discussed potential findings of hematuria work up including, but not limited to, kidney stones, bladder tumors and/or kidney tumors.  Also discussed the potential for a normal work up.    Plan  CT Urogram with follow up for cystoscopy

## 2019-02-11 ENCOUNTER — HOSPITAL ENCOUNTER (OUTPATIENT)
Dept: CT IMAGING | Facility: OTHER | Age: 79
Discharge: HOME | End: 2019-02-11
Attending: UROLOGY | Admitting: UROLOGY
Payer: MEDICARE

## 2019-02-11 ENCOUNTER — OFFICE VISIT (OUTPATIENT)
Dept: UROLOGY | Facility: OTHER | Age: 79
End: 2019-02-11
Attending: UROLOGY
Payer: MEDICARE

## 2019-02-11 VITALS — RESPIRATION RATE: 16 BRPM | WEIGHT: 202 LBS | HEART RATE: 84 BPM | BODY MASS INDEX: 26.65 KG/M2

## 2019-02-11 DIAGNOSIS — C61 MALIGNANT NEOPLASM OF PROSTATE (H): ICD-10-CM

## 2019-02-11 DIAGNOSIS — R31.0 GROSS HEMATURIA: ICD-10-CM

## 2019-02-11 DIAGNOSIS — R31.0 GROSS HEMATURIA: Primary | ICD-10-CM

## 2019-02-11 LAB
CREAT SERPL-MCNC: 1.05 MG/DL (ref 0.7–1.3)
GFR SERPL CREATININE-BSD FRML MDRD: 69 ML/MIN/{1.73_M2}

## 2019-02-11 PROCEDURE — 82565 ASSAY OF CREATININE: CPT | Performed by: UROLOGY

## 2019-02-11 PROCEDURE — 36415 COLL VENOUS BLD VENIPUNCTURE: CPT | Performed by: UROLOGY

## 2019-02-11 PROCEDURE — 52000 CYSTOURETHROSCOPY: CPT | Performed by: UROLOGY

## 2019-02-11 PROCEDURE — G0463 HOSPITAL OUTPT CLINIC VISIT: HCPCS | Mod: 25

## 2019-02-11 PROCEDURE — 25500064 ZZH RX 255 OP 636: Performed by: UROLOGY

## 2019-02-11 PROCEDURE — 74178 CT ABD&PLV WO CNTR FLWD CNTR: CPT

## 2019-02-11 RX ADMIN — IOHEXOL 100 ML: 350 INJECTION, SOLUTION INTRAVENOUS at 13:14

## 2019-02-11 ASSESSMENT — PAIN SCALES - GENERAL: PAINLEVEL: NO PAIN (0)

## 2019-02-11 NOTE — NURSING NOTE
Patient positioned in supine position, perineum area prepped with chlorhexidene Gluconate and patient draped per sterile technique. Per verbal order read back by Oscar Moore MD, Urojet 10mL 2% lidocaine jelly to be instilled into urethra.  Urojet- 10ml 2% Lidocaine jelly instilled into the urethra.    Urojet 2%  Lot#: [id number]  Expiration date: 9/20  : [employer]  NDC: [address]-4    [facility] Protocol    A. Pre-procedure verification complete Yes  1-relevant information / documentation available, reviewed and properly matched to the patient; 2-consent accurate and complete, 3-equipment and supplies available    B. Site marking complete N/A  Site marked if not in continuous attendance with patient    C. TIME OUT completed Yes  Time Out was conducted just prior to starting procedure to verify the eight required elements: 1-patient identity, 2-consent accurate and complete, 3-position, 4-correct side/site marked (if applicable), 5-procedure, 6-relevant images / results properly labeled and displayed (if applicable), 7-antibiotics / irrigation fluids (if applicable), 8-safety precautions.    After procedure perineum area rinsed. Discharge instructions reviewed with patient. Patient verbalized understanding of discharge instructions and discharged ambulatory.  Yuli Millan..................2/11/2019  2:19 PM

## 2019-02-11 NOTE — PROGRESS NOTES
Preprocedure diagnosis  Hematuria    Postprocedure diagnosis  Hematuria    Procedure  Flexible Cystourethroscopy    Surgeon  Oscar Moore MD    Anesthesia  2% lidocaine jelly intraurethrally    Complications  None    Indications  78 year old male undergoing a flexible cystoscopy for the above mentioned indications.    Records also reveal a PSA from December 2018 of 0.53    Findings  Cystoscopic findings included a normal anterior urethra.    The bladder appeared to be normal capacity.    There were no tumors, stones or foreign bodies.    The orifices were slit-shaped and in their normal location.    Procedure  The patient was placed in supine position and prepped and draped in sterile fashion with lidocaine jelly per urethra for anesthesia.    I passed a lubricated 14F flexible cystoscope through the penile urethra and into the bladder and the bladder was completely visualized.  The cystoscope was retroflexed and the bladder neck and prostate visualized.    The cystoscope was slowly withdrawn while visualizing the urethra and the procedure terminated.    The patient tolerated the procedure well.      Plan  Reassurance

## 2019-02-11 NOTE — PATIENT INSTRUCTIONS

## 2019-11-19 DIAGNOSIS — C61 MALIGNANT NEOPLASM OF PROSTATE (H): Primary | ICD-10-CM

## 2019-11-19 NOTE — PROGRESS NOTES
"Note from 2/11/19 states: \"Records also reveal a PSA from December 2018 of 0.53\"  To MD to sign order.  Yuli Millan RN......November 19, 2019...3:46 PM   "

## 2019-11-21 ENCOUNTER — OFFICE VISIT (OUTPATIENT)
Dept: UROLOGY | Facility: OTHER | Age: 79
End: 2019-11-21
Attending: UROLOGY
Payer: MEDICARE

## 2019-11-21 VITALS
DIASTOLIC BLOOD PRESSURE: 78 MMHG | RESPIRATION RATE: 16 BRPM | SYSTOLIC BLOOD PRESSURE: 132 MMHG | HEART RATE: 68 BPM | WEIGHT: 214 LBS | BODY MASS INDEX: 28.23 KG/M2

## 2019-11-21 DIAGNOSIS — C61 MALIGNANT NEOPLASM OF PROSTATE (H): ICD-10-CM

## 2019-11-21 DIAGNOSIS — Z85.46 HISTORY OF PROSTATE CANCER: Primary | ICD-10-CM

## 2019-11-21 LAB — PSA SERPL-MCNC: 0.77 NG/ML

## 2019-11-21 PROCEDURE — 36415 COLL VENOUS BLD VENIPUNCTURE: CPT | Mod: ZL | Performed by: UROLOGY

## 2019-11-21 PROCEDURE — G0463 HOSPITAL OUTPT CLINIC VISIT: HCPCS

## 2019-11-21 PROCEDURE — 99213 OFFICE O/P EST LOW 20 MIN: CPT | Performed by: UROLOGY

## 2019-11-21 PROCEDURE — 84153 ASSAY OF PSA TOTAL: CPT | Mod: ZL | Performed by: UROLOGY

## 2019-11-21 ASSESSMENT — PAIN SCALES - GENERAL: PAINLEVEL: NO PAIN (0)

## 2019-11-21 NOTE — PROGRESS NOTES
Type of Visit  EST    Chief Complaint  History of prostate cancer    HPI  Mr. Blanton is a 79 year old male with history of prostate cancer status post EBRT 5 years ago who follows up with PSA.  I saw the patient 1 year ago for gross hematuria and he underwent a work-up which was negative including CT urogram and cystoscopy.  He reports no health changes in the last year.  He denies any recurrent gross hematuria episodes.  He underwent a PSA earlier and follows up to review the results.    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      Family History  Family History   Problem Relation Age of Onset     Prostate Cancer Father      Cerebrovascular Disease Father      Breast Cancer Mother 49        Cancer-breast,     Other - See Comments Other         He had nine siblings-four sisters and two brothers still living.  Three -one from a motor vehicle accident, one from a stroke, one from a crib death.     Other - See Comments Brother         aneurysm       Review of Systems  I personally reviewed the ROS with the patient.    Nursing Notes:   Shayna Villar LPN  2019 11:49 AM  Signed  Pt presents to clinic for yearly prostate cancer follow up    Review of Systems:    Weight loss:    No     Recent fever/chills:  No   Night sweats:   No  Current skin rash:  No   Recent hair loss:  No  Heat intolerance:  No   Cold intolerance:  No  Chest pain:   No   Palpitations:   No  Shortness of breath:  No   Wheezing:   No  Constipation:    No   Diarrhea:   No   Nausea:   No   Vomiting:   No   Kidney/side pain:  No   Back pain:   No  Frequent headaches:  No   Dizziness:     No  Leg swelling:   No   Calf pain:    No          Physical Exam  Vitals:    19 1124   BP: 132/78   BP Location: Right arm   Patient Position: Sitting   Cuff Size: Adult Regular   Pulse: 68   Resp: 16   Weight: 97.1 kg (214 lb)     Constitutional: No acute distress.  Alert  and cooperative   Head: NCAT  Eyes: Conjunctivae normal  Cardiovascular: Regular rate.  Pulmonary/Chest: Respirations are even and non-labored bilaterally, no audible wheezing  Abdominal: Soft. No distension, tenderness, masses or guarding.   Neurological: A + O x 3.  Cranial Nerves II-XII grossly intact.  Extremities: MANA x 4, Warm. No clubbing.  No cyanosis.    Skin: Pink, warm and dry.  No visible rashes noted.  Psychiatric:  Normal mood and affect  Back:  No left CVA tenderness.  No right CVA tenderness.  Genitourinary:  Nonpalpable bladder    Labs  Results for AURELIA BLANTON (MRN [id number]) as of 11/21/2019 11:50   11/21/2019 09:27   Prostate Specific Antigen 0.774     Records also reveal a PSA from December 2018 of 0.53    Imaging  CTU  2/11/2019  IMPRESSION:    Prostatomegaly.    Assessment  Mr. Blanton is a 79 year old male with history of prostate cancer status post EBRT 5 years ago who follows up with PSA.    Plan  Annual PSA

## 2019-11-21 NOTE — NURSING NOTE
Pt presents to clinic for yearly prostate cancer follow up    Review of Systems:    Weight loss:    No     Recent fever/chills:  No   Night sweats:   No  Current skin rash:  No   Recent hair loss:  No  Heat intolerance:  No   Cold intolerance:  No  Chest pain:   No   Palpitations:   No  Shortness of breath:  No   Wheezing:   No  Constipation:    No   Diarrhea:   No   Nausea:   No   Vomiting:   No   Kidney/side pain:  No   Back pain:   No  Frequent headaches:  No   Dizziness:     No  Leg swelling:   No   Calf pain:    No

## 2020-10-21 DIAGNOSIS — Z85.46 HISTORY OF PROSTATE CANCER: Primary | ICD-10-CM

## 2020-11-19 ENCOUNTER — OFFICE VISIT (OUTPATIENT)
Dept: UROLOGY | Facility: OTHER | Age: 80
End: 2020-11-19
Attending: UROLOGY
Payer: MEDICARE

## 2020-11-19 VITALS
WEIGHT: 201.6 LBS | BODY MASS INDEX: 26.6 KG/M2 | DIASTOLIC BLOOD PRESSURE: 82 MMHG | SYSTOLIC BLOOD PRESSURE: 134 MMHG | HEART RATE: 60 BPM | RESPIRATION RATE: 12 BRPM

## 2020-11-19 DIAGNOSIS — Z85.46 HISTORY OF PROSTATE CANCER: ICD-10-CM

## 2020-11-19 DIAGNOSIS — Z85.46 HISTORY OF PROSTATE CANCER: Primary | ICD-10-CM

## 2020-11-19 LAB — PSA SERPL-MCNC: 1.14 NG/ML

## 2020-11-19 PROCEDURE — G0463 HOSPITAL OUTPT CLINIC VISIT: HCPCS

## 2020-11-19 PROCEDURE — 84153 ASSAY OF PSA TOTAL: CPT | Mod: ZL | Performed by: UROLOGY

## 2020-11-19 PROCEDURE — 99213 OFFICE O/P EST LOW 20 MIN: CPT | Performed by: UROLOGY

## 2020-11-19 PROCEDURE — 36415 COLL VENOUS BLD VENIPUNCTURE: CPT | Mod: ZL | Performed by: UROLOGY

## 2020-11-19 ASSESSMENT — PAIN SCALES - GENERAL: PAINLEVEL: NO PAIN (0)

## 2020-11-19 NOTE — NURSING NOTE
"Chief Complaint   Patient presents with     Follow Up     1 year- PSA     Review of Systems:    Weight loss:    No     Recent fever/chills:  No   Night sweats:   No  Current skin rash:  No   Recent hair loss:  No  Heat intolerance:  No   Cold intolerance:  No  Chest pain:   No   Palpitations:   No  Shortness of breath:  No   Wheezing:   No  Constipation:    No   Diarrhea:   No   Nausea:   No   Vomiting:   No   Kidney/side pain:  No   Back pain:   No  Frequent headaches:  No   Dizziness:     No  Leg swelling:   No   Calf pain:    No        Initial /82 (BP Location: Right arm, Patient Position: Sitting, Cuff Size: Adult Large)   Pulse 60   Resp 12   Wt 91.4 kg (201 lb 9.6 oz)   BMI 26.60 kg/m   Estimated body mass index is 26.6 kg/m  as calculated from the following:    Height as of 10/8/18: 1.854 m (6' 1\").    Weight as of this encounter: 91.4 kg (201 lb 9.6 oz).  Medication Reconciliation: Completed     Michael Xie LPN  "

## 2020-12-20 ENCOUNTER — HEALTH MAINTENANCE LETTER (OUTPATIENT)
Age: 80
End: 2020-12-20

## 2021-07-20 DIAGNOSIS — Z85.46 HISTORY OF PROSTATE CANCER: Primary | ICD-10-CM

## 2021-08-24 ENCOUNTER — TELEPHONE (OUTPATIENT)
Dept: UROLOGY | Facility: OTHER | Age: 81
End: 2021-08-24

## 2021-08-24 NOTE — TELEPHONE ENCOUNTER
I called [facility] to schedule a 1 year follow up appointment with PSA lab prior.  Patient declined appointment at this time.  [facility] and the numbers were good.Stated he would call to make appt if any problems come up.    Thank you,  Ladan Zaldivar on 8/24/2021 at 9:47 AM

## 2021-10-03 ENCOUNTER — HEALTH MAINTENANCE LETTER (OUTPATIENT)
Age: 81
End: 2021-10-03

## 2022-01-23 ENCOUNTER — HEALTH MAINTENANCE LETTER (OUTPATIENT)
Age: 82
End: 2022-01-23

## 2022-09-04 ENCOUNTER — HEALTH MAINTENANCE LETTER (OUTPATIENT)
Age: 82
End: 2022-09-04

## 2022-11-21 ENCOUNTER — DOCUMENTATION ONLY (OUTPATIENT)
Dept: OTHER | Facility: CLINIC | Age: 82
End: 2022-11-21

## 2022-12-31 ENCOUNTER — HOSPITAL ENCOUNTER (EMERGENCY)
Facility: OTHER | Age: 82
Discharge: HOME | End: 2022-12-31
Attending: STUDENT IN AN ORGANIZED HEALTH CARE EDUCATION/TRAINING PROGRAM | Admitting: STUDENT IN AN ORGANIZED HEALTH CARE EDUCATION/TRAINING PROGRAM
Payer: MEDICARE

## 2022-12-31 VITALS
BODY MASS INDEX: 26.51 KG/M2 | DIASTOLIC BLOOD PRESSURE: 95 MMHG | TEMPERATURE: 98.5 F | WEIGHT: 200 LBS | RESPIRATION RATE: 16 BRPM | OXYGEN SATURATION: 97 % | HEIGHT: 73 IN | HEART RATE: 74 BPM | SYSTOLIC BLOOD PRESSURE: 195 MMHG

## 2022-12-31 DIAGNOSIS — R31.0 GROSS HEMATURIA: ICD-10-CM

## 2022-12-31 DIAGNOSIS — R33.9 URINARY RETENTION: ICD-10-CM

## 2022-12-31 LAB
ALBUMIN UR-MCNC: ABNORMAL G/DL
ANION GAP SERPL CALCULATED.3IONS-SCNC: 8 MMOL/L (ref 7–15)
APPEARANCE UR: ABNORMAL
BASOPHILS # BLD AUTO: 0 10E3/UL (ref 0–0.2)
BASOPHILS NFR BLD AUTO: 1 %
BILIRUB UR QL STRIP: ABNORMAL
BUN SERPL-MCNC: 14.5 MG/DL (ref 8–23)
CALCIUM SERPL-MCNC: 9.5 MG/DL (ref 8.8–10.2)
CHLORIDE SERPL-SCNC: 101 MMOL/L (ref 98–107)
COLOR UR AUTO: ABNORMAL
CREAT SERPL-MCNC: 1.04 MG/DL (ref 0.67–1.17)
DEPRECATED HCO3 PLAS-SCNC: 25 MMOL/L (ref 22–29)
EOSINOPHIL # BLD AUTO: 0.1 10E3/UL (ref 0–0.7)
EOSINOPHIL NFR BLD AUTO: 2 %
ERYTHROCYTE [DISTWIDTH] IN BLOOD BY AUTOMATED COUNT: 13.2 % (ref 10–15)
GFR SERPL CREATININE-BSD FRML MDRD: 73 ML/MIN/1.73M2
GLUCOSE SERPL-MCNC: 100 MG/DL (ref 70–99)
GLUCOSE UR STRIP-MCNC: ABNORMAL MG/DL
HCT VFR BLD AUTO: 42.7 % (ref 40–53)
HGB BLD-MCNC: 14.4 G/DL (ref 13.3–17.7)
HGB UR QL STRIP: ABNORMAL
HOLD SPECIMEN: NORMAL
IMM GRANULOCYTES # BLD: 0 10E3/UL
IMM GRANULOCYTES NFR BLD: 0 %
KETONES UR STRIP-MCNC: ABNORMAL MG/DL
LEUKOCYTE ESTERASE UR QL STRIP: ABNORMAL
LYMPHOCYTES # BLD AUTO: 1 10E3/UL (ref 0.8–5.3)
LYMPHOCYTES NFR BLD AUTO: 22 %
MCH RBC QN AUTO: 29.6 PG (ref 26.5–33)
MCHC RBC AUTO-ENTMCNC: 33.7 G/DL (ref 31.5–36.5)
MCV RBC AUTO: 88 FL (ref 78–100)
MONOCYTES # BLD AUTO: 0.4 10E3/UL (ref 0–1.3)
MONOCYTES NFR BLD AUTO: 10 %
MUCOUS THREADS #/AREA URNS LPF: PRESENT /LPF
NEUTROPHILS # BLD AUTO: 2.9 10E3/UL (ref 1.6–8.3)
NEUTROPHILS NFR BLD AUTO: 65 %
NITRATE UR QL: ABNORMAL
NRBC # BLD AUTO: 0 10E3/UL
NRBC BLD AUTO-RTO: 0 /100
PH UR STRIP: ABNORMAL [PH]
PLATELET # BLD AUTO: 175 10E3/UL (ref 150–450)
POTASSIUM SERPL-SCNC: 4.3 MMOL/L (ref 3.4–5.3)
RBC # BLD AUTO: 4.87 10E6/UL (ref 4.4–5.9)
RBC URINE: >182 /HPF
SODIUM SERPL-SCNC: 134 MMOL/L (ref 136–145)
SP GR UR STRIP: 1.01 (ref 1–1.03)
UROBILINOGEN UR STRIP-MCNC: ABNORMAL MG/DL
WBC # BLD AUTO: 4.5 10E3/UL (ref 4–11)
WBC URINE: 12 /HPF

## 2022-12-31 PROCEDURE — 51702 INSERT TEMP BLADDER CATH: CPT

## 2022-12-31 PROCEDURE — 99284 EMERGENCY DEPT VISIT MOD MDM: CPT

## 2022-12-31 PROCEDURE — 87086 URINE CULTURE/COLONY COUNT: CPT | Performed by: STUDENT IN AN ORGANIZED HEALTH CARE EDUCATION/TRAINING PROGRAM

## 2022-12-31 PROCEDURE — 80048 BASIC METABOLIC PNL TOTAL CA: CPT | Performed by: STUDENT IN AN ORGANIZED HEALTH CARE EDUCATION/TRAINING PROGRAM

## 2022-12-31 PROCEDURE — 99284 EMERGENCY DEPT VISIT MOD MDM: CPT | Performed by: STUDENT IN AN ORGANIZED HEALTH CARE EDUCATION/TRAINING PROGRAM

## 2022-12-31 PROCEDURE — 81001 URINALYSIS AUTO W/SCOPE: CPT | Performed by: STUDENT IN AN ORGANIZED HEALTH CARE EDUCATION/TRAINING PROGRAM

## 2022-12-31 PROCEDURE — 85004 AUTOMATED DIFF WBC COUNT: CPT | Performed by: STUDENT IN AN ORGANIZED HEALTH CARE EDUCATION/TRAINING PROGRAM

## 2022-12-31 PROCEDURE — 36415 COLL VENOUS BLD VENIPUNCTURE: CPT | Performed by: STUDENT IN AN ORGANIZED HEALTH CARE EDUCATION/TRAINING PROGRAM

## 2022-12-31 ASSESSMENT — ACTIVITIES OF DAILY LIVING (ADL): ADLS_ACUITY_SCORE: 33

## 2022-12-31 NOTE — ED TRIAGE NOTES
Patient comes to ER from home with c/o urinary retention that started about 2 nights ago. He noticed some blood and blood clots in his urine. He states he has had this issue before.

## 2023-01-01 NOTE — ED PROVIDER NOTES
History     Chief Complaint   Patient presents with     Urinary Retention       HPI   Po Blanton is a 82 year old male who presents with urinary retention.  Onset 2 nights ago with passing of some blood and clots.  Resolved yesterday during the day passing clear urine.  Last evening again he noted blood and clots and has been having difficulty getting any urine out.  He has a remote history of prostate cancer status post radiation.  He has had similar symptoms in the past including use of an indwelling catheter. No fevers, chills, chest pain, diffuse abdominal pain, nausea, vomiting, shortness of breath. No constipation.    No Known Allergies    Patient Active Problem List    Diagnosis Date Noted     Diverticulosis of large intestine 01/25/2018     Priority: Medium     Atrial fibrillation, unspecified type (H) 02/24/2017     Priority: Medium     Leg edema, left 10/28/2014     Priority: Medium     Routine general medical examination at a health care facility 10/28/2014     Priority: Medium     Malignant neoplasm of prostate (H) 05/16/2013     Priority: Medium     Overview:   external beam radiation therapy       Hypertrophy of prostate with urinary obstruction and other lower urinary tract symptoms (LUTS) 12/16/2011     Priority: Medium     Acute prostatitis 12/13/2011     Priority: Medium     Elevated prostate specific antigen (PSA) 02/08/2011     Priority: Medium     Hyperlipidemia 08/24/2009     Priority: Medium       Past Medical History:   Diagnosis Date     Benign neoplasm of colon      Esophageal reflux      Hypertrophy of prostate without urinary obstruction and other lower urinary tract symptoms (LUTS)        Past Surgical History:   Procedure Laterality Date     BIOPSY      biopsy of prostate- needle/punch     COLONOSCOPY  05/07/2018    Dr. Humphreys, [facility]     GENITOURINARY SURGERY  01/06/2012    TURP     GENITOURINARY SURGERY  2008    cystoscopy     OTHER SURGICAL HISTORY       ",[id number],([place]) FACILITY COLORECTAL CA SCREEN FLEX SCOPE       Family History   Problem Relation Age of Onset     Prostate Cancer Father      Cerebrovascular Disease Father      Breast Cancer Mother 49        Cancer-breast,     Other - See Comments Other         He had nine siblings-four sisters and two brothers still living.  Three -one from a motor vehicle accident, one from a stroke, one from a crib death.     Other - See Comments Brother         aneurysm       Social History     Tobacco Use     Smoking status: Never     Smokeless tobacco: Never   Substance Use Topics     Alcohol use: Yes     Comment: rarely       Medications:    APIXABAN PO  fluticasone (FLONASE) 50 MCG/ACT spray  multivitamin, therapeutic with minerals (THERA-VIT-M) TABS tablet  sodium chloride (OCEAN) 0.65 % nasal spray  triamcinolone (KENALOG) 0.1 % cream        Review of Systems: See HPI for pertinent negatives and positives. All other systems reviewed and found to be negative.    Physical Exam   BP (!) 195/95   Pulse 74   Temp 98.5  F (36.9  C) (Tympanic)   Resp 16   Ht 1.854 m (6' 1\")   Wt 90.7 kg (200 lb)   SpO2 97%   BMI 26.39 kg/m       Physical Exam    General: awake, comfortable  HEENT: atraumatic, neck supple  Respiratory: normal effort, clear to auscultation bilaterally  Cardiovascular: regular rate and rhythm, no murmurs  Abdomen: soft, nondistended, nontender  Extremities: no deformities, edema, or tenderness  : no cva tenderness  Skin: warm, dry, no rashes  Neuro: alert, no focal deficits  Psych: appropriate mood and affect    ED Course           Results for orders placed or performed during the hospital encounter of 22 (from the past 24 hour(s))   UA with Microscopic reflex to Culture    Specimen: Urine, Clean Catch   Result Value Ref Range    Color Urine Dark Red (A) Colorless, Straw, Light Yellow, Yellow    Appearance Urine Cloudy (A) Clear    Glucose Urine      Bilirubin Urine      Ketones " Urine      Specific Gravity Urine 1.012 1.003 - 1.035    Blood Urine      pH Urine      Protein Albumin Urine      Urobilinogen Urine      Nitrite Urine      Leukocyte Esterase Urine      Mucus Urine Present (A) None Seen /LPF    RBC Urine >182 (H) <=2 /HPF    WBC Urine 12 (H) <=5 /HPF    Narrative    Urine Culture ordered based on laboratory criteria   Basic metabolic panel   Result Value Ref Range    Sodium 134 (L) 136 - 145 mmol/L    Potassium 4.3 3.4 - 5.3 mmol/L    Chloride 101 98 - 107 mmol/L    Carbon Dioxide (CO2) 25 22 - 29 mmol/L    Anion Gap 8 7 - 15 mmol/L    Urea Nitrogen 14.5 8.0 - 23.0 mg/dL    Creatinine 1.04 0.67 - 1.17 mg/dL    Calcium 9.5 8.8 - 10.2 mg/dL    Glucose 100 (H) 70 - 99 mg/dL    GFR Estimate 73 >60 mL/min/1.73m2   CBC with platelets differential    Narrative    The following orders were created for panel order CBC with platelets differential.  Procedure                               Abnormality         Status                     ---------                               -----------         ------                     CBC with platelets and d...[[id number]]                      Final result                 Please view results for these tests on the individual orders.   Extra Tube    Narrative    The following orders were created for panel order Extra Tube.  Procedure                               Abnormality         Status                     ---------                               -----------         ------                     Extra Blue Top Tube[[id number]]                              Final result               Extra Serum Separator Tu...[[id number]]                      Final result               Extra Green Top (Lithium...[[id number]]                      Final result                 Please view results for these tests on the individual orders.   Extra Blue Top Tube   Result Value Ref Range    Hold Specimen JIC    Extra Serum Separator Tube (SST)   Result Value Ref Range    Hold Specimen  JIC    Extra Green Top (Lithium Heparin) ON ICE   Result Value Ref Range    Hold Specimen JIC    CBC with platelets and differential   Result Value Ref Range    WBC Count 4.5 4.0 - 11.0 10e3/uL    RBC Count 4.87 4.40 - 5.90 10e6/uL    Hemoglobin 14.4 13.3 - 17.7 g/dL    Hematocrit 42.7 40.0 - 53.0 %    MCV 88 78 - 100 fL    MCH 29.6 26.5 - 33.0 pg    MCHC 33.7 31.5 - 36.5 g/dL    RDW 13.2 10.0 - 15.0 %    Platelet Count 175 150 - 450 10e3/uL    % Neutrophils 65 %    % Lymphocytes 22 %    % Monocytes 10 %    % Eosinophils 2 %    % Basophils 1 %    % Immature Granulocytes 0 %    NRBCs per 100 WBC 0 <1 /100    Absolute Neutrophils 2.9 1.6 - 8.3 10e3/uL    Absolute Lymphocytes 1.0 0.8 - 5.3 10e3/uL    Absolute Monocytes 0.4 0.0 - 1.3 10e3/uL    Absolute Eosinophils 0.1 0.0 - 0.7 10e3/uL    Absolute Basophils 0.0 0.0 - 0.2 10e3/uL    Absolute Immature Granulocytes 0.0 <=0.4 10e3/uL    Absolute NRBCs 0.0 10e3/uL       Medications - No data to display    Assessments & Plan (with Medical Decision Making)     I have reviewed the nursing notes.    Patient evaluated for urinary retention with intermittent clot passing. Bladder scan with 275 ml. UA with gross hematuria. No obvious symptoms or signs of infection. History includes remote prostate cancer s/p treatment. CBC and BMP without concerning findings. Indwelling catheter placed and patient discharged home with attached instructions on diagnosis including ED return precautions.    I have reviewed the findings, diagnosis, plan and need for any follow up with the patient.    Patient instructions:   Please keep an indwelling catheter until you see urology. Referral for this was placed. Call grand Paul first thing Monday morning to schedule appointment. Please review attached instructions including reasons to return to the emergency department.      Discharge Medication List as of 12/31/2022  6:35 PM          Final diagnoses:   Urinary retention   Gross hematuria        12/31/2022   [facility]       Emmett Duong MD  12/31/22 2002

## 2023-01-01 NOTE — DISCHARGE INSTRUCTIONS
Please keep an indwelling catheter until you see urology. Referral for this was placed. Call grand Paul first thing Monday morning to schedule appointment. Please review attached instructions including reasons to return to the emergency department.

## 2023-01-02 ENCOUNTER — TELEPHONE (OUTPATIENT)
Dept: UROLOGY | Facility: OTHER | Age: 83
End: 2023-01-02

## 2023-01-02 NOTE — TELEPHONE ENCOUNTER
Patient was in the ER and had a catheter placed for urinary retention. He is scheduled for a void trial on 01/10/23. They are asking if they can get a smaller bag for the patients leg that will fit under his pants. States this is very important to patient.   Please contact patient.    Charlotte Bose on 1/2/2023 at 11:03 AM

## 2023-01-03 ENCOUNTER — ALLIED HEALTH/NURSE VISIT (OUTPATIENT)
Dept: UROLOGY | Facility: OTHER | Age: 83
End: 2023-01-03
Attending: UROLOGY
Payer: MEDICARE

## 2023-01-03 VITALS
BODY MASS INDEX: 27.22 KG/M2 | WEIGHT: 206.3 LBS | HEART RATE: 88 BPM | RESPIRATION RATE: 18 BRPM | OXYGEN SATURATION: 97 % | TEMPERATURE: 97.7 F

## 2023-01-03 DIAGNOSIS — R33.9 URINARY RETENTION: Primary | ICD-10-CM

## 2023-01-03 LAB — BACTERIA UR CULT: NO GROWTH

## 2023-01-03 PROCEDURE — 51700 IRRIGATION OF BLADDER: CPT

## 2023-01-03 PROCEDURE — 51798 US URINE CAPACITY MEASURE: CPT

## 2023-01-03 ASSESSMENT — PAIN SCALES - GENERAL: PAINLEVEL: NO PAIN (0)

## 2023-01-03 NOTE — PATIENT INSTRUCTIONS
Male Clean Intermittent Self-Catheterization (CIC)  Catheterization is a way to empty al the urine from your bladder  This keeps urine from sitting in your bladder  If urine sits in your bladder too long, it can cause a bladder or kidney infection  This is also called self-cath or CIC    Instructions  Catheterize yourself as needed   If you feel you need to catheterize more frequently do so  Try to allow yourself to urinate prior to catheterizing    Supplies       Catheter - clear or red rubber tube.       Water-soluble lubricant such as K-Y jelly or Surgilube. Do not use Vaseline.       Urine container if needed. Use any jug, bottle or urinal which can attach   to the side of a bed, chair, or wheelchair, or can be held between your knees    Steps to Follow  You may catheterize yourself while sitting on the toilet, in a wheelchair, in bed or while standing  Wash your hands well with soap and water or use an alcohol based hand   Wash the end of your penis well with soap and water. If you are not circumcised, be sure to pull back your foreskin and keep it back during the procedure  Take the catheter out of the plastic bag. Put a small amount of lubricant on the tip of the catheter. Cover the tip and about 2 inches up the catheter  In one hand, hold the catheter about 1 inch from the lubricated tip  With the other hand, hold your penis away from your body  Gently put the catheter into the urinary opening (urethra)  About 6 inches into the urethra there is a ring of muscle tissue that the catheter must pass through. At this point it may be a little harder to pass the catheter. Take a deep breath and gently apply steady pressure. The catheter should pass into the bladder.  Never use force to pass the catheter  Continue to put the catheter in until urine flows out. Then insert it another 1 to 2 inches. Let the urine flow into the urine container or into the toilet. An extension tube attached to the end of your  catheter will give you the extra tubing needed to reach the toilet from a wheelchair  When urine stops flowing, take deep breaths or press on your lower abdomen  Slowly pull the catheter out. Stop pulling the catheter out any time urine starts to flow. Again, take some deep breaths or press on your lower abdomen. Repeat this step until the urine completely stops    Catheter Care  Lather up your hands and wash the catheter by rubbing it between your soapy hands  Rinse well with water inside and out  Dry with a clean towel or tissue  Lay catheter on a clean towel so the inside can air dry  Store the catheter in a clean plastic bag or other clean container such as a cosmetic bag or paper towel  Catheters may be reused until they become brittle, show wear, crack, or do not drain well    When to Call Your Doctor  Call your doctor if you have any of these signs of infection:       Chills and / or fever       Leaking urine in between catheterization (if this is not normal for you)       Not feeling well, tired, weak       Pain or tenderness across the lower back       Increased muscle or bladder spasms (pain)

## 2023-01-03 NOTE — NURSING NOTE
Pt presents to clinic for Void trial/CIC teaching  Void Trial  Verbal order read back by Oscar Moore MD to perform void trial and post void residual bladder scan.  Patient's bladder was filled under gravity with 200mL of sterile saline.  Patient voided 300mL.      Patient was taught to self cath with an 18 [place] Coude   50ml urine return.  Patient was scheduled to return 1/9/23 for a PVR

## 2023-01-09 ENCOUNTER — OFFICE VISIT (OUTPATIENT)
Dept: UROLOGY | Facility: OTHER | Age: 83
End: 2023-01-09
Attending: UROLOGY
Payer: COMMERCIAL

## 2023-01-09 VITALS
DIASTOLIC BLOOD PRESSURE: 76 MMHG | WEIGHT: 206.2 LBS | HEART RATE: 69 BPM | OXYGEN SATURATION: 97 % | RESPIRATION RATE: 16 BRPM | BODY MASS INDEX: 27.2 KG/M2 | SYSTOLIC BLOOD PRESSURE: 128 MMHG

## 2023-01-09 DIAGNOSIS — R33.9 URINARY RETENTION: ICD-10-CM

## 2023-01-09 DIAGNOSIS — R31.0 GROSS HEMATURIA: ICD-10-CM

## 2023-01-09 DIAGNOSIS — Z85.46 HISTORY OF PROSTATE CANCER: Primary | ICD-10-CM

## 2023-01-09 LAB — PSA SERPL-MCNC: 2.35 NG/ML

## 2023-01-09 PROCEDURE — 36415 COLL VENOUS BLD VENIPUNCTURE: CPT | Mod: ZL | Performed by: UROLOGY

## 2023-01-09 PROCEDURE — 51798 US URINE CAPACITY MEASURE: CPT | Performed by: UROLOGY

## 2023-01-09 PROCEDURE — 84153 ASSAY OF PSA TOTAL: CPT | Mod: ZL | Performed by: UROLOGY

## 2023-01-09 PROCEDURE — 99214 OFFICE O/P EST MOD 30 MIN: CPT | Performed by: UROLOGY

## 2023-01-09 PROCEDURE — G0463 HOSPITAL OUTPT CLINIC VISIT: HCPCS

## 2023-01-09 ASSESSMENT — PAIN SCALES - GENERAL: PAINLEVEL: NO PAIN (0)

## 2023-01-09 NOTE — PROGRESS NOTES
Type of Visit  EST    Chief Complaint  Urinary retention  Gross hematuria  History of prostate cancer    HPI  Mr. Blanton is a 82 year old male with history of prostate cancer status post EBRT 8 years ago who follows up unscheduled with urinary retention.    Since the patient received radiation his PSA had been monitored for a few years.  The most recent PSA was in 2020.  The PSA was detectable and rising at that time however the patient has not undergone a PSA since.    Approximately 2 weeks ago the patient developed urinary retention and presented to the emergency department.  A catheter was placed.  He followed up in the short-term on a Tuesday and was taught self-catheterization.  The catheter was removed however the patient was able to void so he never perform self-catheterization.  He follows up today with a PVR.    Finally the patient has experienced intermittent gross hematuria.  He has undergone a work-up in the past however the most recent upper tract imaging and cystoscopy was performed in 2019.  The pattern is consistent.  He has experienced small-volume random episodes of gross hematuria that self resolved within 1-2 voids.  He denies dysuria and fevers.  No gross hematuria today.      Review of Systems  I personally reviewed the ROS with the patient.    Nursing Notes:   Sugey Nguyen LPN  1/9/2023  7:48 AM  Signed  Chief Complaint   Patient presents with     Follow Up     Self Cathing     Patient presents to the clinic today for a follow up for self cathing.    Review of Systems:    Weight loss:    No     Recent fever/chills:  No   Night sweats:   No  Current skin rash:  No   Recent hair loss:  No  Heat intolerance:  No   Cold intolerance:  No  Chest pain:   No   Palpitations:   No  Shortness of breath:  No   Wheezing:   No  Constipation:    No   Diarrhea:   No   Nausea:   No   Vomiting:   No   Kidney/side pain:  No   Back pain:   No  Frequent headaches:  No   Dizziness:     No  Leg  swelling:   No   Calf pain:    No      Medication Reconciliation: completed   Sugey Nguyen LPN  1/9/2023 7:47 AM       Physical Exam  Vitals:    01/09/23 0753   BP: 128/76   BP Location: Right arm   Patient Position: Sitting   Cuff Size: Adult Large   Pulse: 69   Resp: 16   SpO2: 97%   Weight: 93.5 kg (206 lb 3.2 oz)   Constitutional: No acute distress.  Alert and cooperative   Cardiovascular: Regular rate.  Pulmonary/Chest: Respirations are even and non-labored bilaterally, no audible wheezing  Abdominal: Soft. No distension, tenderness, masses or guarding.   Extremities: MANA x 4, Warm. No clubbing.  No cyanosis.    Skin: Pink, warm and dry.  No visible rashes noted.  Back:  No left CVA tenderness.  No right CVA tenderness.  Genitourinary:  Nonpalpable bladder    Labs  Results for AURELIA BLANTON (MRN [id number]) as of 11/19/2020 10:41   11/21/2019 09:27 11/19/2020 08:31   PSA 0.774 1.140     Records also reveal a PSA from December 2018 of 0.53    Imaging  CTU  2/11/2019  IMPRESSION:    Prostatomegaly.    Post-Void Residual  A post-void residual was measured by ultrasonic bladder scanner.  5 mL today    Assessment  Mr. Blanton is a 82 year old male with history of prostate cancer status post EBRT 8 years ago who follows up unscheduled with urinary retention and gross hematuria.  Regarding urinary retention the patient's PVR today shows he is emptying well.  Regarding his history of prostate cancer recommended updated PSA.  Regarding gross hematuria we discussed a modified work-up given his history of prior episodes of benign gross hematuria.  We elected to proceed with a renal ultrasound and cystoscopy.    Plan  Patient needs an updated PSA  Follow-up for renal ultrasound and cystoscopy to complete gross hematuria work-up

## 2023-01-09 NOTE — NURSING NOTE
Chief Complaint   Patient presents with     Follow Up     Self Cathing     Patient presents to the clinic today for a follow up for self cathing.    Review of Systems:    Weight loss:    No     Recent fever/chills:  No   Night sweats:   No  Current skin rash:  No   Recent hair loss:  No  Heat intolerance:  No   Cold intolerance:  No  Chest pain:   No   Palpitations:   No  Shortness of breath:  No   Wheezing:   No  Constipation:    No   Diarrhea:   No   Nausea:   No   Vomiting:   No   Kidney/side pain:  No   Back pain:   No  Frequent headaches:  No   Dizziness:     No  Leg swelling:   No   Calf pain:    No    Post-Void Residual  A post-void residual was measured by ultrasonic bladder scanner.  5 mL  Sugey Nguyen LPN  1/9/2023 7:58 AM    Medication Reconciliation: completed   Sugey Nguyen LPN  1/9/2023 7:47 AM

## 2023-01-23 ENCOUNTER — HOSPITAL ENCOUNTER (OUTPATIENT)
Dept: ULTRASOUND IMAGING | Facility: OTHER | Age: 83
Discharge: HOME | End: 2023-01-23
Attending: UROLOGY
Payer: MEDICARE

## 2023-01-23 ENCOUNTER — OFFICE VISIT (OUTPATIENT)
Dept: UROLOGY | Facility: OTHER | Age: 83
End: 2023-01-23
Attending: UROLOGY
Payer: MEDICARE

## 2023-01-23 VITALS
WEIGHT: 205 LBS | HEART RATE: 73 BPM | RESPIRATION RATE: 16 BRPM | OXYGEN SATURATION: 94 % | BODY MASS INDEX: 27.05 KG/M2 | TEMPERATURE: 97.5 F

## 2023-01-23 DIAGNOSIS — R31.0 GROSS HEMATURIA: ICD-10-CM

## 2023-01-23 DIAGNOSIS — R39.15 URINARY URGENCY: ICD-10-CM

## 2023-01-23 DIAGNOSIS — R33.9 URINARY RETENTION: ICD-10-CM

## 2023-01-23 DIAGNOSIS — R31.0 GROSS HEMATURIA: Primary | ICD-10-CM

## 2023-01-23 DIAGNOSIS — Z85.46 HISTORY OF PROSTATE CANCER: ICD-10-CM

## 2023-01-23 LAB
ALBUMIN UR-MCNC: NEGATIVE MG/DL
APPEARANCE UR: CLEAR
BILIRUB UR QL STRIP: NEGATIVE
COLOR UR AUTO: NORMAL
GLUCOSE UR STRIP-MCNC: NEGATIVE MG/DL
HGB UR QL STRIP: NEGATIVE
KETONES UR STRIP-MCNC: NEGATIVE MG/DL
LEUKOCYTE ESTERASE UR QL STRIP: NEGATIVE
NITRATE UR QL: NEGATIVE
PH UR STRIP: 6.5 [PH] (ref 5–9)
SP GR UR STRIP: 1.02 (ref 1–1.03)
UROBILINOGEN UR STRIP-MCNC: NORMAL MG/DL

## 2023-01-23 PROCEDURE — 76770 US EXAM ABDO BACK WALL COMP: CPT

## 2023-01-23 PROCEDURE — G0463 HOSPITAL OUTPT CLINIC VISIT: HCPCS | Mod: 25

## 2023-01-23 PROCEDURE — 52000 CYSTOURETHROSCOPY: CPT | Performed by: UROLOGY

## 2023-01-23 PROCEDURE — 99214 OFFICE O/P EST MOD 30 MIN: CPT | Mod: 25 | Performed by: UROLOGY

## 2023-01-23 PROCEDURE — 81003 URINALYSIS AUTO W/O SCOPE: CPT | Mod: ZL | Performed by: UROLOGY

## 2023-01-23 ASSESSMENT — PAIN SCALES - GENERAL: PAINLEVEL: NO PAIN (0)

## 2023-01-23 NOTE — PATIENT INSTRUCTIONS

## 2023-01-23 NOTE — PROGRESS NOTES
Type of Visit  EST    Chief Complaint  Gross hematuria  History of prostate cancer with rising PSA  Renal cyst    HPI  Mr. Blanton is a 82 year old male with history of prostate cancer status post EBRT 8 years ago who follows up with recent gross hematuria and labs.    Since the patient received radiation his PSA had been monitored for a few years.  The most recent PSA was in 2020.  The PSA was detectable and rising at that time however the patient has not undergone a PSA since.  He underwent a PSA collection prior to this visit.    Approximately 1 month ago the patient developed urinary retention and presented to the emergency department.  He subsequently passed a void trial.  He was taught self-catheterization so he has this skill and knowledge to manage issues in the future.    Finally the patient has experienced intermittent gross hematuria.  He has undergone a work-up in the past however the most recent upper tract imaging and cystoscopy was performed in 2019.  The pattern is consistent.  He has experienced small-volume random episodes of gross hematuria that self resolved within 1-2 voids.  He denies dysuria and fevers.  Because of this symptom he underwent a renal ultrasound prior to this visit and plans to follow-up for cystoscopy.  The renal ultrasound identified a cyst on the left kidney.  Of note the patient underwent a CT urogram in 2019.      Review of Systems  I personally reviewed the ROS with the patient.    Weight loss:    No     Recent fever/chills:  No   Night sweats:   No  Current skin rash:  No   Recent hair loss:  No  Heat intolerance:  No   Cold intolerance:  No  Chest pain:   No   Palpitations:   No  Shortness of breath:  No   Wheezing:   No  Constipation:    No   Diarrhea:   No   Nausea:   No   Vomiting:   No   Kidney/side pain:  No   Back pain:   No  Frequent headaches:  No   Dizziness:     No  Leg swelling:   No   Calf pain:    No    Nursing Notes:   Shayna Villar, MARCIA  1/23/2023   1:01 PM  Signed  Patient positioned in supine position, perineum area prepped with chlorhexidene Gluconate and patient draped per sterile technique. Per verbal order read back by Oscar Moore MD, Urojet 10mL 2% lidocaine jelly to be instilled into urethra.  Urojet- 10ml 2% Lidocaine jelly instilled into the urethra.    Urojet 2%  Lot#: [id number]  Expiration date: 7/2024  : [employer]  NDC: [address]-9    [place] Protocol    A. Pre-procedure verification complete Yes  1-relevant information / documentation available, reviewed and properly matched to the patient; 2-consent accurate and complete, 3-equipment and supplies available    B. Site marking complete N/A  Site marked if not in continuous attendance with patient    C. TIME OUT completed Yes  Time Out was conducted just prior to starting procedure to verify the eight required elements: 1-patient identity, 2-consent accurate and complete, 3-position, 4-correct side/site marked (if applicable), 5-procedure, 6-relevant images / results properly labeled and displayed (if applicable), 7-antibiotics / irrigation fluids (if applicable), 8-safety precautions.    After procedure perineum area rinsed. Discharge instructions reviewed with patient. Patient verbalized understanding of discharge instructions and discharged ambulatory.  Shayna Villar LPN..................1/23/2023  1:01 PM        Physical Exam  Vitals:    01/23/23 1313   Pulse: 73   Resp: 16   Temp: 97.5  F (36.4  C)   TempSrc: Temporal   SpO2: 94%   Weight: 93 kg (205 lb)   Constitutional: No acute distress.  Alert and cooperative   Cardiovascular: Regular rate.  Pulmonary/Chest: Respirations are even and non-labored bilaterally, no audible wheezing  Abdominal: Soft. No distension, tenderness, masses or guarding.   Extremities: MANA x 4, Warm. No clubbing.  No cyanosis.    Skin: Pink, warm and dry.  No visible rashes noted.  Back:  No left CVA tenderness.  No right CVA  tenderness.  Genitourinary:  Nonpalpable bladder    ^^^^^^^^^^^^^^^^^^^^^^^^^^^^^^^^^^^^^^^^^^^^^^^^^^^^^^^^^^^^^^^    Preprocedure diagnosis  Gross hematuria  History of pelvic radiation    Postprocedure diagnosis  Gross hematuria  History of pelvic radiation    Procedure  Flexible Cystourethroscopy    Surgeon  Oscar Moore MD    Anesthesia  2% lidocaine jelly intraurethrally    Complications  None    Indications  82 year old male undergoing a flexible cystoscopy for the above mentioned indications.    Findings  Cystoscopic findings included a normal anterior urethra.    A prior TURP defect was identified with some moderate prostate regrowth.  The bladder appeared to be normal capacity.    There were no tumors, stones or foreign bodies.    The orifices were slit-shaped and in their normal location.    Procedure  The patient was placed in supine position and prepped and draped in sterile fashion with lidocaine jelly per urethra for anesthesia.    I passed a lubricated 14F flexible cystoscope through the penile urethra and into the bladder and the bladder was completely visualized.  The cystoscope was retroflexed and the bladder neck and prostate visualized.    The cystoscope was slowly withdrawn while visualizing the urethra and the procedure terminated.    The patient tolerated the procedure well.      ^^^^^^^^^^^^^^^^^^^^^^^^^^^^^^^^^^^^^^^^^^^^^^^^^^^^^^^^^^^^^^^    Labs   01/23/23 13:06   Color Urine Light Yellow   Appearance Urine Clear   Glucose Urine Negative   Bilirubin Urine Negative   Ketones Urine Negative   Specific Gravity Urine 1.022   pH Urine 6.5   Protein Albumin Urine Negative   Urobilinogen mg/dL Normal   Nitrite Urine Negative   Blood Urine Negative   Leukocyte Esterase Urine Negative      01/09/23 08:21   PSA 2.35     Results for AURELIA TORRES (MRN [id number]) as of 11/19/2020 10:41   11/21/2019 09:27 11/19/2020 08:31   PSA 0.774 1.140      PSA  0.53  2018    Imaging  FER  1/23/2023  IMPRESSION:    Prostatomegaly. No hydronephrosis.  Equivocal left inferior renal cortical mass.   Recommend follow-up CT urogram.      ^^^^^^^^^^^^^^^^^^    CTU  2/11/2019  IMPRESSION:    Prostatomegaly.    Post-Void Residual  A post-void residual was measured by ultrasonic bladder scanner.  5 mL (previously recorded)    Assessment  Mr. Blanton is a 82 year old male with history of prostate cancer status post EBRT 8 years ago who follows up with gross hematuria.  He underwent FER prior to this visit and underwent cystoscopy today to complete the hematuria work-up.  Cystoscopy was within normal limits.  The renal ultrasound was interpreted as well as the formal CT urogram performed 4 years ago.  The CT urogram reveals a left-sided inferior pole renal cyst.  The ultrasound today shows a similar cyst that is slightly larger as would be expected for years later.  We discussed pursuing another CT urogram as suggested by the radiologist versus continuing with observation knowing the prior CT urogram revealed a cyst in the similar spot.  Following this conversation we both elected to proceed with observation.    Regarding the patient's PSA it continues to rise slowly.  We will need to follow this annually.  If the rate of rise changes or accelerates we may need to consider additional imaging and possibly ADT in the future.    Plan  Continue self-catheterization as needed for retention  Reassurance regarding the left renal cyst.   -Discussed repeating the CT urogram but based on the 2019 imaging is and in combination with the ultrasound today we elected to defer  Follow up once annually for PSA

## 2023-01-23 NOTE — NURSING NOTE
Patient positioned in supine position, perineum area prepped with chlorhexidene Gluconate and patient draped per sterile technique. Per verbal order read back by Oscar Moore MD, Urojet 10mL 2% lidocaine jelly to be instilled into urethra.  Urojet- 10ml 2% Lidocaine jelly instilled into the urethra.    Urojet 2%  Lot#: [id number]  Expiration date: 7/2024  : [employer]  NDC: [address]-9    [place] Protocol    A. Pre-procedure verification complete Yes  1-relevant information / documentation available, reviewed and properly matched to the patient; 2-consent accurate and complete, 3-equipment and supplies available    B. Site marking complete N/A  Site marked if not in continuous attendance with patient    C. TIME OUT completed Yes  Time Out was conducted just prior to starting procedure to verify the eight required elements: 1-patient identity, 2-consent accurate and complete, 3-position, 4-correct side/site marked (if applicable), 5-procedure, 6-relevant images / results properly labeled and displayed (if applicable), 7-antibiotics / irrigation fluids (if applicable), 8-safety precautions.    After procedure perineum area rinsed. Discharge instructions reviewed with patient. Patient verbalized understanding of discharge instructions and discharged ambulatory.  Shayna Villar LPN..................1/23/2023  1:01 PM

## 2023-04-29 ENCOUNTER — HEALTH MAINTENANCE LETTER (OUTPATIENT)
Age: 83
End: 2023-04-29

## 2023-11-07 ENCOUNTER — APPOINTMENT (OUTPATIENT)
Dept: CT IMAGING | Facility: OTHER | Age: 83
End: 2023-11-07
Attending: PHYSICIAN ASSISTANT
Payer: MEDICARE

## 2023-11-07 ENCOUNTER — HOSPITAL ENCOUNTER (EMERGENCY)
Facility: OTHER | Age: 83
Discharge: HOME | End: 2023-11-07
Attending: PHYSICIAN ASSISTANT | Admitting: PHYSICIAN ASSISTANT
Payer: MEDICARE

## 2023-11-07 ENCOUNTER — APPOINTMENT (OUTPATIENT)
Dept: GENERAL RADIOLOGY | Facility: OTHER | Age: 83
End: 2023-11-07
Attending: PHYSICIAN ASSISTANT
Payer: MEDICARE

## 2023-11-07 ENCOUNTER — APPOINTMENT (OUTPATIENT)
Dept: MRI IMAGING | Facility: OTHER | Age: 83
End: 2023-11-07
Attending: PHYSICIAN ASSISTANT
Payer: MEDICARE

## 2023-11-07 VITALS
WEIGHT: 205 LBS | HEART RATE: 56 BPM | TEMPERATURE: 96.8 F | SYSTOLIC BLOOD PRESSURE: 163 MMHG | OXYGEN SATURATION: 99 % | DIASTOLIC BLOOD PRESSURE: 93 MMHG | RESPIRATION RATE: 16 BRPM | BODY MASS INDEX: 27.17 KG/M2 | HEIGHT: 73 IN

## 2023-11-07 DIAGNOSIS — R42 DIZZINESS: ICD-10-CM

## 2023-11-07 DIAGNOSIS — I10 HYPERTENSION: ICD-10-CM

## 2023-11-07 DIAGNOSIS — R79.89 ELEVATED TSH: ICD-10-CM

## 2023-11-07 LAB
ALBUMIN SERPL BCG-MCNC: 4.4 G/DL (ref 3.5–5.2)
ALBUMIN UR-MCNC: NEGATIVE MG/DL
ALP SERPL-CCNC: 76 U/L (ref 40–129)
ALT SERPL W P-5'-P-CCNC: 18 U/L (ref 0–70)
ANION GAP SERPL CALCULATED.3IONS-SCNC: 8 MMOL/L (ref 7–15)
ANION GAP SERPL CALCULATED.3IONS-SCNC: 9 MMOL/L (ref 7–15)
APPEARANCE UR: CLEAR
APTT PPP: 35 SECONDS (ref 22–38)
AST SERPL W P-5'-P-CCNC: 22 U/L (ref 0–45)
ATRIAL RATE - MUSE: 59 BPM
BASOPHILS # BLD AUTO: 0.1 10E3/UL (ref 0–0.2)
BASOPHILS NFR BLD AUTO: 2 %
BILIRUB SERPL-MCNC: 1.1 MG/DL
BILIRUB UR QL STRIP: NEGATIVE
BUN SERPL-MCNC: 16.6 MG/DL (ref 8–23)
BUN SERPL-MCNC: 17.1 MG/DL (ref 8–23)
CALCIUM SERPL-MCNC: 9.4 MG/DL (ref 8.8–10.2)
CALCIUM SERPL-MCNC: 9.7 MG/DL (ref 8.8–10.2)
CHLORIDE SERPL-SCNC: 103 MMOL/L (ref 98–107)
CHLORIDE SERPL-SCNC: 103 MMOL/L (ref 98–107)
COLOR UR AUTO: YELLOW
CREAT SERPL-MCNC: 1.1 MG/DL (ref 0.67–1.17)
CREAT SERPL-MCNC: 1.14 MG/DL (ref 0.67–1.17)
DEPRECATED HCO3 PLAS-SCNC: 26 MMOL/L (ref 22–29)
DEPRECATED HCO3 PLAS-SCNC: 26 MMOL/L (ref 22–29)
DIASTOLIC BLOOD PRESSURE - MUSE: NORMAL MMHG
EGFRCR SERPLBLD CKD-EPI 2021: 65 ML/MIN/1.73M2
EGFRCR SERPLBLD CKD-EPI 2021: 67 ML/MIN/1.73M2
EOSINOPHIL # BLD AUTO: 0.3 10E3/UL (ref 0–0.7)
EOSINOPHIL NFR BLD AUTO: 9 %
ERYTHROCYTE [DISTWIDTH] IN BLOOD BY AUTOMATED COUNT: 13 % (ref 10–15)
FLUAV RNA SPEC QL NAA+PROBE: NEGATIVE
FLUBV RNA RESP QL NAA+PROBE: NEGATIVE
GLUCOSE BLDC GLUCOMTR-MCNC: 80 MG/DL (ref 70–99)
GLUCOSE SERPL-MCNC: 97 MG/DL (ref 70–99)
GLUCOSE SERPL-MCNC: 99 MG/DL (ref 70–99)
GLUCOSE UR STRIP-MCNC: NEGATIVE MG/DL
HCT VFR BLD AUTO: 45.4 % (ref 40–53)
HGB BLD-MCNC: 14.9 G/DL (ref 13.3–17.7)
HGB UR QL STRIP: NEGATIVE
HOLD SPECIMEN: NORMAL
HOLD SPECIMEN: NORMAL
IMM GRANULOCYTES # BLD: 0 10E3/UL
IMM GRANULOCYTES NFR BLD: 0 %
INR PPP: 1.1 (ref 0.85–1.15)
INTERPRETATION ECG - MUSE: NORMAL
KETONES UR STRIP-MCNC: NEGATIVE MG/DL
LEUKOCYTE ESTERASE UR QL STRIP: NEGATIVE
LYMPHOCYTES # BLD AUTO: 0.8 10E3/UL (ref 0.8–5.3)
LYMPHOCYTES NFR BLD AUTO: 21 %
MAGNESIUM SERPL-MCNC: 2 MG/DL (ref 1.7–2.3)
MCH RBC QN AUTO: 29.5 PG (ref 26.5–33)
MCHC RBC AUTO-ENTMCNC: 32.8 G/DL (ref 31.5–36.5)
MCV RBC AUTO: 90 FL (ref 78–100)
MONOCYTES # BLD AUTO: 0.4 10E3/UL (ref 0–1.3)
MONOCYTES NFR BLD AUTO: 9 %
NEUTROPHILS # BLD AUTO: 2.4 10E3/UL (ref 1.6–8.3)
NEUTROPHILS NFR BLD AUTO: 59 %
NITRATE UR QL: NEGATIVE
NRBC # BLD AUTO: 0 10E3/UL
NRBC BLD AUTO-RTO: 0 /100
NT-PROBNP SERPL-MCNC: 50 PG/ML (ref 0–1800)
P AXIS - MUSE: 47 DEGREES
PH UR STRIP: 7 [PH] (ref 5–9)
PLATELET # BLD AUTO: 189 10E3/UL (ref 150–450)
POTASSIUM SERPL-SCNC: 4.5 MMOL/L (ref 3.4–5.3)
POTASSIUM SERPL-SCNC: 5.1 MMOL/L (ref 3.4–5.3)
PR INTERVAL - MUSE: 178 MS
PROT SERPL-MCNC: 7.4 G/DL (ref 6.4–8.3)
QRS DURATION - MUSE: 88 MS
QT - MUSE: 394 MS
QTC - MUSE: 390 MS
R AXIS - MUSE: -2 DEGREES
RBC # BLD AUTO: 5.05 10E6/UL (ref 4.4–5.9)
RBC URINE: 1 /HPF
RSV RNA SPEC NAA+PROBE: NEGATIVE
SARS-COV-2 RNA RESP QL NAA+PROBE: NEGATIVE
SODIUM SERPL-SCNC: 137 MMOL/L (ref 135–145)
SODIUM SERPL-SCNC: 138 MMOL/L (ref 135–145)
SP GR UR STRIP: 1.02 (ref 1–1.03)
SYSTOLIC BLOOD PRESSURE - MUSE: NORMAL MMHG
T AXIS - MUSE: 14 DEGREES
T4 FREE SERPL-MCNC: 1.23 NG/DL (ref 0.9–1.7)
TROPONIN T SERPL HS-MCNC: 10 NG/L
TSH SERPL DL<=0.005 MIU/L-ACNC: 8.33 UIU/ML (ref 0.3–4.2)
UROBILINOGEN UR STRIP-MCNC: NORMAL MG/DL
VENTRICULAR RATE- MUSE: 59 BPM
WBC # BLD AUTO: 3.9 10E3/UL (ref 4–11)
WBC URINE: 1 /HPF

## 2023-11-07 PROCEDURE — 84484 ASSAY OF TROPONIN QUANT: CPT | Performed by: PHYSICIAN ASSISTANT

## 2023-11-07 PROCEDURE — 85730 THROMBOPLASTIN TIME PARTIAL: CPT | Performed by: PHYSICIAN ASSISTANT

## 2023-11-07 PROCEDURE — 93010 ELECTROCARDIOGRAM REPORT: CPT | Performed by: INTERNAL MEDICINE

## 2023-11-07 PROCEDURE — 87484 EHRLICHA CHAFFEENSIS AMP PRB: CPT | Performed by: PHYSICIAN ASSISTANT

## 2023-11-07 PROCEDURE — 84443 ASSAY THYROID STIM HORMONE: CPT | Performed by: PHYSICIAN ASSISTANT

## 2023-11-07 PROCEDURE — 99285 EMERGENCY DEPT VISIT HI MDM: CPT | Performed by: PHYSICIAN ASSISTANT

## 2023-11-07 PROCEDURE — 86618 LYME DISEASE ANTIBODY: CPT | Performed by: PHYSICIAN ASSISTANT

## 2023-11-07 PROCEDURE — 71045 X-RAY EXAM CHEST 1 VIEW: CPT

## 2023-11-07 PROCEDURE — 93005 ELECTROCARDIOGRAM TRACING: CPT | Performed by: PHYSICIAN ASSISTANT

## 2023-11-07 PROCEDURE — 80053 COMPREHEN METABOLIC PANEL: CPT | Performed by: PHYSICIAN ASSISTANT

## 2023-11-07 PROCEDURE — 70450 CT HEAD/BRAIN W/O DYE: CPT | Mod: MG,XU

## 2023-11-07 PROCEDURE — 99285 EMERGENCY DEPT VISIT HI MDM: CPT | Mod: 25 | Performed by: PHYSICIAN ASSISTANT

## 2023-11-07 PROCEDURE — 36415 COLL VENOUS BLD VENIPUNCTURE: CPT | Performed by: PHYSICIAN ASSISTANT

## 2023-11-07 PROCEDURE — 85610 PROTHROMBIN TIME: CPT | Performed by: PHYSICIAN ASSISTANT

## 2023-11-07 PROCEDURE — 250N000011 HC RX IP 250 OP 636: Performed by: PHYSICIAN ASSISTANT

## 2023-11-07 PROCEDURE — 83735 ASSAY OF MAGNESIUM: CPT | Performed by: PHYSICIAN ASSISTANT

## 2023-11-07 PROCEDURE — 70553 MRI BRAIN STEM W/O & W/DYE: CPT | Mod: MG

## 2023-11-07 PROCEDURE — A9575 INJ GADOTERATE MEGLUMI 0.1ML: HCPCS | Mod: JZ | Performed by: PHYSICIAN ASSISTANT

## 2023-11-07 PROCEDURE — 82962 GLUCOSE BLOOD TEST: CPT

## 2023-11-07 PROCEDURE — 84439 ASSAY OF FREE THYROXINE: CPT | Performed by: PHYSICIAN ASSISTANT

## 2023-11-07 PROCEDURE — 70496 CT ANGIOGRAPHY HEAD: CPT | Mod: MG

## 2023-11-07 PROCEDURE — 82310 ASSAY OF CALCIUM: CPT | Performed by: PHYSICIAN ASSISTANT

## 2023-11-07 PROCEDURE — 0042T CT HEAD PERFUSION W CONTRAST: CPT

## 2023-11-07 PROCEDURE — 81001 URINALYSIS AUTO W/SCOPE: CPT | Performed by: PHYSICIAN ASSISTANT

## 2023-11-07 PROCEDURE — 255N000002 HC RX 255 OP 636: Mod: JZ | Performed by: PHYSICIAN ASSISTANT

## 2023-11-07 PROCEDURE — 99207 PR NO CHARGE LOS: CPT | Performed by: NURSE PRACTITIONER

## 2023-11-07 PROCEDURE — 83880 ASSAY OF NATRIURETIC PEPTIDE: CPT | Mod: GZ | Performed by: PHYSICIAN ASSISTANT

## 2023-11-07 PROCEDURE — 85025 COMPLETE CBC W/AUTO DIFF WBC: CPT | Performed by: PHYSICIAN ASSISTANT

## 2023-11-07 PROCEDURE — 87637 SARSCOV2&INF A&B&RSV AMP PRB: CPT | Performed by: PHYSICIAN ASSISTANT

## 2023-11-07 RX ORDER — IOPAMIDOL 755 MG/ML
75 INJECTION, SOLUTION INTRAVASCULAR ONCE
Status: COMPLETED | OUTPATIENT
Start: 2023-11-07 | End: 2023-11-07

## 2023-11-07 RX ORDER — IOPAMIDOL 755 MG/ML
40 INJECTION, SOLUTION INTRAVASCULAR ONCE
Status: COMPLETED | OUTPATIENT
Start: 2023-11-07 | End: 2023-11-07

## 2023-11-07 RX ORDER — LISINOPRIL 10 MG/1
10 TABLET ORAL DAILY
COMMUNITY

## 2023-11-07 RX ADMIN — IOPAMIDOL 75 ML: 755 INJECTION, SOLUTION INTRAVENOUS at 12:25

## 2023-11-07 RX ADMIN — GADOTERATE MEGLUMINE 19 ML: 376.9 INJECTION INTRAVENOUS at 14:47

## 2023-11-07 RX ADMIN — IOPAMIDOL 40 ML: 755 INJECTION, SOLUTION INTRAVENOUS at 12:25

## 2023-11-07 ASSESSMENT — ACTIVITIES OF DAILY LIVING (ADL)
ADLS_ACUITY_SCORE: 35
ADLS_ACUITY_SCORE: 35

## 2023-11-07 NOTE — DISCHARGE INSTRUCTIONS
-Follow-up with your primary care doctor for your high blood pressure/blood pressure medication management and follow-up with your primary care doctor for your elevated TSH/hypothyroidism  -Return to the ER for any worsening of symptoms.  -No signs of acute stroke on today's work-up.

## 2023-11-07 NOTE — CONSULTS
"  [facility]    Stroke Telephone Note    I was called by Lalo Lancaster on 11/07/23 regarding patient Po Blanton. The patient is a 83 year old male with hx afib on Eliquis, HLD. He comes in with dizziness and difficulty walking that was first noticed when he got up to use the bathroom around 0200. LKW prior to bed yesterday. Symptoms were persistent on waking. Per ED, the dizziness is constant. He is able to ambulate. No focal weakness, numbness, language impairment or visual deficit. He took his morning dose of Eliquis. He had a similar episode in August that he was not evaluated for.     BP (!) 184/91   Pulse 60   Temp 96.8  F (36  C) (Tympanic)   Resp 16   Ht 1.854 m (6' 1\")   Wt 93 kg (205 lb)   SpO2 100%   BMI 27.05 kg/m       Stroke Code Data (for stroke code without tele)  Stroke code activated 11/07/23   1217   Stroke provider first response  11/07/23   1219            Last known normal 11/06/23   2100        Time of discovery   (or onset of symptoms) 11/07/23   0200   Head CT read by Stroke Neuro Dr/Provider 11/07/23   1228   Was stroke code de-escalated? Yes 11/07/23 1248          Imaging Findings  CT head: negative for acute pathology  CTA head/neck: no significant stenosis or LVO    Intravenous Thrombolysis  Not given due to:   - minor/isolated/quickly resolving symptoms  - DOAC dose within 48 hours or INR > 1.7  - unclear or unfavorable risk-benefit profile for extended window thrombolysis beyond the conventional 4.5 hour time window    Endovascular Treatment  Not initiated due to absence of proximal vessel occlusion    Impression  Dizziness and impaired gait, concern for acute stroke.    Recommendations   - MRI brain w/wo.     ADDENDUM:   MRI brain negative for stroke. No further stroke evaluation indicated at this time.     Case discussed with vascular neurology attending Dr. Jiang    My recommendations are based on the information provided over the " "phone by Po Blanton's in-person providers. They are not intended to replace the clinical judgment of his in-person providers. I was not requested to personally see or examine the patient at this time.    Kathia Sawyer, CNP  Vascular Neurology    To page me or covering stroke neurology team member, click here: [facility]  Choose \"On Call\" tab at top, then select \"NEUROLOGY/ALL SITES\" from middle drop-down box, press Enter, then look for \"stroke\" or \"telestroke\" for your site.    "

## 2023-11-07 NOTE — ED PROVIDER NOTES
"      EMERGENCY DEPARTMENT ENCOUNTER      NAME: Po Blanton  AGE: 83 year old male  YOB: 1940  MRN: [id number]  EVALUATION DATE & TIME: 11/7/2023 11:33 AM    PCP: Kumar Gonzalez    ED PROVIDER: Lalo Lancaster PA-C       CHIEF COMPLAINT:  Chief Complaint   Patient presents with     Hypertension         ED COURSE, MEDICAL DECISION MAKING, FINAL IMPRESSION AND PLAN:      The patient was interviewed and examined.  HPI and physical exam as below.  Differential diagnosis and MDM Key Documentation Elements as below.  Vitals and triage note were reviewed.  BP (!) 163/93   Pulse 56   Temp 96.8  F (36  C) (Tympanic)   Resp 16   Ht 1.854 m (6' 1\")   Wt 93 kg (205 lb)   SpO2 99%   BMI 27.05 kg/m      Differential includes but is not limited to CVA, TIA, hypertension, hypothyroid    Patient is afebrile with elevated blood pressure.  Patient was in no acute distress.  Physical examination today was fairly reassuring.  No focal findings.  Stroke score was 0.  Imaging unremarkable for signs of acute stroke.  Blood pressure improved on its own.  Not need any IV medications.  ED Course as of 11/12/23 1125   Tue Nov 07, 2023   1216 Tier 2 Stroke code called.  Last well time 0100 AM  11/7/2023   1219 Spoke with stroke neurology at [facility]   1250 Spoke with Dr. Mauro, radiology.  No signs of acute stroke or large vessel occlusion on CT imaging   1251 Spoke with stroke neurology at [facility].  No signs of acute stroke on imaging.  May de-escalate   1259 Spoke with Dr. Mauro-radiology.  No signs of acute stroke on CT perfusion   1642 Reviewed MRI 1532.  Negative     Patient may follow-up with primary care.  Return to the ER for any worsening of symptoms.  Currently no indication for intervention or TNK.      Assessment / Plan:  1. Dizziness    2. Hypertension    3. Elevated TSH          Medications given during today's ER visit:  Medications   iopamidol " (ISOVUE-370) solution 75 mL (75 mLs Intravenous $Given 11/7/23 1225)   iopamidol (ISOVUE-370) solution 40 mL (40 mLs Intravenous $Given 11/7/23 1225)   gadoterate meglumine (DOTAREM) injection 20 mL (19 mLs Intravenous $Given 11/7/23 1447)       New prescriptions started at today's ER visit  Discharge Medication List as of 11/7/2023  4:30 PM          Pertinent Labs & Imaging studies reviewed. (See chart for details)  Results for orders placed or performed during the hospital encounter of 11/07/23   CT Head w/o Contrast    Impression    IMPRESSION: Negative Head CT    RHONDA SLADE MD         SYSTEM ID:  [id number]   CTA Head Neck with Contrast    Impression    IMPRESSION:     CTA head: No acute findings.  CTA neck: No acute findings. 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 Consider follow-up in one year to evaluate for  change.    [Result: No acute abnormality on CT head and CTA head and neck]    Finding was identified on 11/7/2023 12:43 PM.    Provider Derrick Lancaster PA-C discussed results over the phone with  Dr. Mauro at 11/7/2023 12:50 PM and verbalized understanding of the  result.      CAL MAURO MD         SYSTEM ID:  [id number]   CT Head Perfusion w Contrast - For Tier 2 Stroke    Impression    IMPRESSION:   No evidence of ischemia or acute infarction on the CT Perfusion  examination.    [Result: No acute abnormality on CT head perfusion]    Finding was identified on 11/7/2023 12:58 PM.     Provider Derrick Lancaster PA-C discussed received results over the  phone from Dr. Mauro at 11/7/2023 12:59 PM and verbalized  understanding of the result.     CAL MAURO MD         SYSTEM ID:  [id number]   XR Chest Port 1 View    Impression    IMPRESSION:  No consolidation or edema.      DERRICK LOTT MD         SYSTEM ID:  [id number]   MR Brain w/o & w Contrast    Impression    IMPRESSION:  1. No evidence of acute infarction or intracranial hemorrhage.  2. Mild presumed small vessel  ischemic disease changes.    CAL MOSS MD         SYSTEM ID:  [id number]   Comprehensive metabolic panel   Result Value Ref Range    Sodium 138 135 - 145 mmol/L    Potassium 4.5 3.4 - 5.3 mmol/L    Carbon Dioxide (CO2) 26 22 - 29 mmol/L    Anion Gap 9 7 - 15 mmol/L    Urea Nitrogen 17.1 8.0 - 23.0 mg/dL    Creatinine 1.14 0.67 - 1.17 mg/dL    GFR Estimate 65 >60 mL/min/1.73m2    Calcium 9.7 8.8 - 10.2 mg/dL    Chloride 103 98 - 107 mmol/L    Glucose 97 70 - 99 mg/dL    Alkaline Phosphatase 76 40 - 129 U/L    AST 22 0 - 45 U/L    ALT 18 0 - 70 U/L    Protein Total 7.4 6.4 - 8.3 g/dL    Albumin 4.4 3.5 - 5.2 g/dL    Bilirubin Total 1.1 <=1.2 mg/dL   Result Value Ref Range    Troponin T, High Sensitivity 10 <=22 ng/L   Result Value Ref Range    Magnesium 2.0 1.7 - 2.3 mg/dL   TSH Reflex GH   Result Value Ref Range    TSH 8.33 (H) 0.30 - 4.20 uIU/mL   Nt probnp inpatient (BNP)   Result Value Ref Range    N terminal Pro BNP Inpatient 50 0 - 1,800 pg/mL   UA with Microscopic reflex to Culture    Specimen: Urine, Midstream   Result Value Ref Range    Color Urine Yellow Colorless, Straw, Light Yellow, Yellow    Appearance Urine Clear Clear    Glucose Urine Negative Negative mg/dL    Bilirubin Urine Negative Negative    Ketones Urine Negative Negative mg/dL    Specific Gravity Urine 1.019 1.000 - 1.030    Blood Urine Negative Negative    pH Urine 7.0 5.0 - 9.0    Protein Albumin Urine Negative Negative mg/dL    Urobilinogen Urine Normal Normal, 2.0 mg/dL    Nitrite Urine Negative Negative    Leukocyte Esterase Urine Negative Negative    RBC Urine 1 <=2 /HPF    WBC Urine 1 <=5 /HPF   CBC with platelets and differential   Result Value Ref Range    WBC Count 3.9 (L) 4.0 - 11.0 10e3/uL    RBC Count 5.05 4.40 - 5.90 10e6/uL    Hemoglobin 14.9 13.3 - 17.7 g/dL    Hematocrit 45.4 40.0 - 53.0 %    MCV 90 78 - 100 fL    MCH 29.5 26.5 - 33.0 pg    MCHC 32.8 31.5 - 36.5 g/dL    RDW 13.0 10.0 - 15.0 %    Platelet Count 189 150 - 450  10e3/uL    % Neutrophils 59 %    % Lymphocytes 21 %    % Monocytes 9 %    % Eosinophils 9 %    % Basophils 2 %    % Immature Granulocytes 0 %    NRBCs per 100 WBC 0 <1 /100    Absolute Neutrophils 2.4 1.6 - 8.3 10e3/uL    Absolute Lymphocytes 0.8 0.8 - 5.3 10e3/uL    Absolute Monocytes 0.4 0.0 - 1.3 10e3/uL    Absolute Eosinophils 0.3 0.0 - 0.7 10e3/uL    Absolute Basophils 0.1 0.0 - 0.2 10e3/uL    Absolute Immature Granulocytes 0.0 <=0.4 10e3/uL    Absolute NRBCs 0.0 10e3/uL   Extra Blue Top Tube   Result Value Ref Range    Hold Specimen JIC    Extra Red Top Tube   Result Value Ref Range    Hold Specimen hold    Result Value Ref Range    INR 1.10 0.85 - 1.15   Partial thromboplastin time   Result Value Ref Range    aPTT 35 22 - 38 Seconds   Glucose by meter   Result Value Ref Range    GLUCOSE BY METER POCT 80 70 - 99 mg/dL   Asymptomatic Influenza A/B, RSV, & SARS-CoV2 PCR (COVID-19) Nose    Specimen: Nose; Swab   Result Value Ref Range    Influenza A PCR Negative Negative    Influenza B PCR Negative Negative    RSV PCR Negative Negative    SARS CoV2 PCR Negative Negative   Result Value Ref Range    Free T4 1.23 0.90 - 1.70 ng/dL   Basic metabolic panel   Result Value Ref Range    Sodium 137 135 - 145 mmol/L    Potassium 5.1 3.4 - 5.3 mmol/L    Chloride 103 98 - 107 mmol/L    Carbon Dioxide (CO2) 26 22 - 29 mmol/L    Anion Gap 8 7 - 15 mmol/L    Urea Nitrogen 16.6 8.0 - 23.0 mg/dL    Creatinine 1.10 0.67 - 1.17 mg/dL    GFR Estimate 67 >60 mL/min/1.73m2    Calcium 9.4 8.8 - 10.2 mg/dL    Glucose 99 70 - 99 mg/dL   Lyme Disease Total Abs Bld with Reflex to Confirm CLIA   Result Value Ref Range    Lyme Disease Antibodies Total 0.35 <0.90   Ehrlichia Anaplasma Sp by PCR   Result Value Ref Range    Anaplasma phagocytophilum Not Detected     Ehrlichia chaffeensis Not Detected     Ehrlichia ewingii/canis Not Detected     Ehrlichia muris-like Not Detected    EKG 12-lead, tracing only   Result Value Ref Range    Systolic  "Blood Pressure  mmHg    Diastolic Blood Pressure  mmHg    Ventricular Rate 59 BPM    Atrial Rate 59 BPM    LA Interval 178 ms    QRS Duration 88 ms     ms    QTc 390 ms    P Axis 47 degrees    R AXIS -2 degrees    T Axis 14 degrees    Interpretation ECG       Sinus bradycardia  Otherwise normal ECG  No previous ECGs available  Confirmed by MD CHAKRABORTY KEITH ([id number]) on 11/7/2023 1:39:44 PM       No results found for: \"ABORH\"      Reassessments, Medications, Interventions, & Response to Treatments:  Patient remained stable during his course of stay here in the ER.  No worsening neurological symptoms.  Blood pressure improved on its own without interventions.  Discussed laboratory and imaging results.  Discussed treatment plan and follow-up    Consultations:  As above    Decision Rules, Medical Calculators, and Risk Stratification Tools:  National Institutes of Health Stroke Scale  Exam Interval: Baseline   Score    Level of consciousness: (0)   Alert, keenly responsive    LOC questions: (0)   Answers both questions correctly    LOC commands: (0)   Performs both tasks correctly    Best gaze: (0)   Normal    Visual: (0)   No visual loss    Facial palsy: (0)   Normal symmetrical movements    Motor arm (left): (0)   No drift    Motor arm (right): (0)   No drift    Motor leg (left): (0)   No drift    Motor leg (right): (0)   No drift    Limb ataxia: (0)   Absent    Sensory: (0)   Normal- no sensory loss    Best language: (0)   Normal- no aphasia    Dysarthria: (0)   Normal    Extinction and inattention: (0)   No abnormality        Total Score:  0          MDM Key Documentation Elements for Patient's Evaluation:  Differential diagnosis to include high risk considerations: As above  Escalation to admission/observation considered: Admission/observation considered, patient was discharged in stable condition  Discussions and management with other clinicians:    3a. Independent interpretation of testing performed by " another health professional:  -Yes  3b. Discussion of management or test interpretation with another health professional: -Yes  Independent interpretation of tests:  Ordering and/or review of 3+ test(s)  Discussion of test interpretations with radiology:  No  History obtained from source other than patient or assessment requiring an independent historian:  No  Review of non-ED/external records:  review of 3+ records  Diagnostic tests considered but not ultimately performed/deferred:  -Echocardiogram  Prescription medications considered but not prescribed:  -Antibiotics, no signs of infection  Chronic conditions affecting care:  - None  Care affected by social determinants of health:  -None    The patient's management involved:   - Laboratory studies  - Imaging studies  - Parenteral controlled substance  - Decision regarding hospitalization      A shared decision making model was used. Time was taken to answer all questions.  Patient and/or associated parties understood and were agreeable to treatment plan.  Plan and all results were discussed.  Papal stable at time of discharge and may be discharged home with close follow-up with primary care      PPE worn during patient evaluation:  Mask: Yes  Eye Protection: No  Gown: No  Hair cover: No  Face Shield: No  Patient wearing a mask: No      MEDICATIONS GIVEN IN THE EMERGENCY:  Medications   iopamidol (ISOVUE-370) solution 75 mL (75 mLs Intravenous $Given 11/7/23 1225)   iopamidol (ISOVUE-370) solution 40 mL (40 mLs Intravenous $Given 11/7/23 1225)   gadoterate meglumine (DOTAREM) injection 20 mL (19 mLs Intravenous $Given 11/7/23 1447)       NEW PRESCRIPTIONS STARTED AT TODAY'S ER VISIT:  Discharge Medication List as of 11/7/2023  4:30 PM             =================================================================    HPI  Po Blanton is a pleasant 83 year old male who presents to the ER today with his daughter for evaluation of dizziness and abnormal balance  issues.  Patient states that he went to bed this morning around 1 AM.  He woke up somewhere between 2 and 3 AM with patient feeling dizzy and abnormal balance.  He went back to bed but when woke up still had similar symptoms.  Patient also noticed that his blood pressure was elevated.  Patient denying any pain.  No headache, stiff neck, neck pain, chest pain, back pain, or abdominal pain.  No lightheadedness, shortness of breath, or dyspnea      REVIEW OF SYSTEMS   Review of Systems  As above, otherwise ROS is unremarkable.      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     Esophageal reflux     No Comments Provided     Hypertrophy of prostate without urinary obstruction and other lower urinary tract symptoms (LUTS)     PSA 3.04 10/00;  3.4 03/05; 5.5 04/08.       PAST SURGICAL HISTORY:  Past Surgical History:   Procedure Laterality Date     BIOPSY      biopsy of prostate- needle/punch     COLONOSCOPY  05/07/2018    Dr. Humphreys, [facility]     GENITOURINARY SURGERY  01/06/2012    TURP     GENITOURINARY SURGERY  2008    cystoscopy     OTHER SURGICAL HISTORY      [id number],([place]) FACILITY COLORECTAL CA SCREEN FLEX SCOPE           CURRENT MEDICATIONS:    Current Outpatient Medications   Medication Instructions     APIXABAN PO 5 mg, Oral, DAILY     fluticasone (FLONASE) 50 MCG/ACT spray 1 spray, Both Nostrils, 2 TIMES DAILY     lisinopril (ZESTRIL) 10 mg, Oral, DAILY     multivitamin, therapeutic with minerals (THERA-VIT-M) TABS tablet 1 tablet, Oral, DAILY     sodium chloride (OCEAN) 0.65 % nasal spray 1 spray, Both Nostrils, PRN     triamcinolone (KENALOG) 0.1 % cream Topical, 2 TIMES DAILY PRN       ALLERGIES:  No Known Allergies    FAMILY HISTORY:  Family History   Problem Relation Age of Onset     Prostate Cancer Father      Cerebrovascular Disease Father      Breast Cancer  "Mother 49        Cancer-breast,     Other - See Comments Other         He had nine siblings-four sisters and two brothers still living.  Three -one from a motor vehicle accident, one from a stroke, one from a crib death.     Other - See Comments Brother         aneurysm       SOCIAL HISTORY:   Social History     Socioeconomic History     Marital status:    Tobacco Use     Smoking status: Never     Smokeless tobacco: Never   Substance and Sexual Activity     Alcohol use: Yes     Comment: rarely   Social History Narrative    .  Retired from [employer] at age 58; he was a .  He has one daughter, Yandy, recently .  Enjoys snowmobiling and outdoor activities    Preloaded 10/29/13       PHYSICAL EXAM    VITAL SIGNS: BP (!) 163/93   Pulse 56   Temp 96.8  F (36  C) (Tympanic)   Resp 16   Ht 1.854 m (6' 1\")   Wt 93 kg (205 lb)   SpO2 99%   BMI 27.05 kg/m      No data found.      Physical Exam  Vitals and nursing note reviewed.   Constitutional:       Appearance: Normal appearance. He is not ill-appearing or diaphoretic.   HENT:      Head: Normocephalic.      Right Ear: Tympanic membrane, ear canal and external ear normal.      Left Ear: Tympanic membrane, ear canal and external ear normal.      Nose: Nose normal.      Mouth/Throat:      Mouth: Mucous membranes are moist.      Pharynx: Oropharynx is clear.   Eyes:      Extraocular Movements: Extraocular movements intact.      Conjunctiva/sclera: Conjunctivae normal.      Pupils: Pupils are equal, round, and reactive to light.   Cardiovascular:      Rate and Rhythm: Normal rate and regular rhythm.      Pulses: Normal pulses.      Heart sounds: Normal heart sounds.   Pulmonary:      Effort: Pulmonary effort is normal.      Breath sounds: Normal breath sounds.   Abdominal:      General: Abdomen is flat. Bowel sounds are normal.      Palpations: Abdomen is soft.      Tenderness: There is no abdominal tenderness. "   Musculoskeletal:         General: Normal range of motion.      Cervical back: Normal range of motion and neck supple.      Right lower leg: No edema.      Left lower leg: No edema.   Skin:     General: Skin is warm and dry.      Capillary Refill: Capillary refill takes less than 2 seconds.   Neurological:      General: No focal deficit present.      Mental Status: He is alert.      Comments: GCS 15.  Alert and orientated x 4.  CN II-XII exam normal.  Normal visual fields.  No slurred speech.  Normal finger-to-finger and finger-to-nose exam.  Muscle strength grossly normal and symmetrical in the upper/lower extremities.  Sensation to light touch grossly intact and symmetrical in the upper/lower extremities.  Stroke score 0   Psychiatric:         Mood and Affect: Mood normal.          LABS & RADIOLOGY:  All pertinent labs reviewed and interpreted. Reviewed all pertinent imaging. Please see official radiology report.  Results for orders placed or performed during the hospital encounter of 11/07/23   CT Head w/o Contrast    Impression    IMPRESSION: Negative Head CT    RHONDA SLADE MD         SYSTEM ID:  [id number]   CTA Head Neck with Contrast    Impression    IMPRESSION:     CTA head: No acute findings.  CTA neck: No acute findings. 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 Consider follow-up in one year to evaluate for  change.    [Result: No acute abnormality on CT head and CTA head and neck]    Finding was identified on 11/7/2023 12:43 PM.    Provider Lalo Lancaster PA-C discussed results over the phone with  Dr. Moss at 11/7/2023 12:50 PM and verbalized understanding of the  result.      CAL MOSS MD         SYSTEM ID:  [id number]   CT Head Perfusion w Contrast - For Tier 2 Stroke    Impression    IMPRESSION:   No evidence of ischemia or acute infarction on the CT Perfusion  examination.    [Result: No acute abnormality on CT head perfusion]    Finding was identified on 11/7/2023  12:58 PM.     Provider Derrick Lancaster PA-C discussed received results over the  phone from Dr. Mauro at 11/7/2023 12:59 PM and verbalized  understanding of the result.     CAL MAURO MD         SYSTEM ID:  [id number]   XR Chest Port 1 View    Impression    IMPRESSION:  No consolidation or edema.      DERRICK LOTT MD         SYSTEM ID:  [id number]   MR Brain w/o & w Contrast    Impression    IMPRESSION:  1. No evidence of acute infarction or intracranial hemorrhage.  2. Mild presumed small vessel ischemic disease changes.    CAL MAURO MD         SYSTEM ID:  [id number]   Comprehensive metabolic panel   Result Value Ref Range    Sodium 138 135 - 145 mmol/L    Potassium 4.5 3.4 - 5.3 mmol/L    Carbon Dioxide (CO2) 26 22 - 29 mmol/L    Anion Gap 9 7 - 15 mmol/L    Urea Nitrogen 17.1 8.0 - 23.0 mg/dL    Creatinine 1.14 0.67 - 1.17 mg/dL    GFR Estimate 65 >60 mL/min/1.73m2    Calcium 9.7 8.8 - 10.2 mg/dL    Chloride 103 98 - 107 mmol/L    Glucose 97 70 - 99 mg/dL    Alkaline Phosphatase 76 40 - 129 U/L    AST 22 0 - 45 U/L    ALT 18 0 - 70 U/L    Protein Total 7.4 6.4 - 8.3 g/dL    Albumin 4.4 3.5 - 5.2 g/dL    Bilirubin Total 1.1 <=1.2 mg/dL   Result Value Ref Range    Troponin T, High Sensitivity 10 <=22 ng/L   Result Value Ref Range    Magnesium 2.0 1.7 - 2.3 mg/dL   TSH Reflex GH   Result Value Ref Range    TSH 8.33 (H) 0.30 - 4.20 uIU/mL   Nt probnp inpatient (BNP)   Result Value Ref Range    N terminal Pro BNP Inpatient 50 0 - 1,800 pg/mL   UA with Microscopic reflex to Culture    Specimen: Urine, Midstream   Result Value Ref Range    Color Urine Yellow Colorless, Straw, Light Yellow, Yellow    Appearance Urine Clear Clear    Glucose Urine Negative Negative mg/dL    Bilirubin Urine Negative Negative    Ketones Urine Negative Negative mg/dL    Specific Gravity Urine 1.019 1.000 - 1.030    Blood Urine Negative Negative    pH Urine 7.0 5.0 - 9.0    Protein Albumin Urine Negative Negative mg/dL     Urobilinogen Urine Normal Normal, 2.0 mg/dL    Nitrite Urine Negative Negative    Leukocyte Esterase Urine Negative Negative    RBC Urine 1 <=2 /HPF    WBC Urine 1 <=5 /HPF   CBC with platelets and differential   Result Value Ref Range    WBC Count 3.9 (L) 4.0 - 11.0 10e3/uL    RBC Count 5.05 4.40 - 5.90 10e6/uL    Hemoglobin 14.9 13.3 - 17.7 g/dL    Hematocrit 45.4 40.0 - 53.0 %    MCV 90 78 - 100 fL    MCH 29.5 26.5 - 33.0 pg    MCHC 32.8 31.5 - 36.5 g/dL    RDW 13.0 10.0 - 15.0 %    Platelet Count 189 150 - 450 10e3/uL    % Neutrophils 59 %    % Lymphocytes 21 %    % Monocytes 9 %    % Eosinophils 9 %    % Basophils 2 %    % Immature Granulocytes 0 %    NRBCs per 100 WBC 0 <1 /100    Absolute Neutrophils 2.4 1.6 - 8.3 10e3/uL    Absolute Lymphocytes 0.8 0.8 - 5.3 10e3/uL    Absolute Monocytes 0.4 0.0 - 1.3 10e3/uL    Absolute Eosinophils 0.3 0.0 - 0.7 10e3/uL    Absolute Basophils 0.1 0.0 - 0.2 10e3/uL    Absolute Immature Granulocytes 0.0 <=0.4 10e3/uL    Absolute NRBCs 0.0 10e3/uL   Extra Blue Top Tube   Result Value Ref Range    Hold Specimen JIC    Extra Red Top Tube   Result Value Ref Range    Hold Specimen hold    Result Value Ref Range    INR 1.10 0.85 - 1.15   Partial thromboplastin time   Result Value Ref Range    aPTT 35 22 - 38 Seconds   Glucose by meter   Result Value Ref Range    GLUCOSE BY METER POCT 80 70 - 99 mg/dL   Asymptomatic Influenza A/B, RSV, & SARS-CoV2 PCR (COVID-19) Nose    Specimen: Nose; Swab   Result Value Ref Range    Influenza A PCR Negative Negative    Influenza B PCR Negative Negative    RSV PCR Negative Negative    SARS CoV2 PCR Negative Negative   Result Value Ref Range    Free T4 1.23 0.90 - 1.70 ng/dL   Basic metabolic panel   Result Value Ref Range    Sodium 137 135 - 145 mmol/L    Potassium 5.1 3.4 - 5.3 mmol/L    Chloride 103 98 - 107 mmol/L    Carbon Dioxide (CO2) 26 22 - 29 mmol/L    Anion Gap 8 7 - 15 mmol/L    Urea Nitrogen 16.6 8.0 - 23.0 mg/dL    Creatinine 1.10 0.67  - 1.17 mg/dL    GFR Estimate 67 >60 mL/min/1.73m2    Calcium 9.4 8.8 - 10.2 mg/dL    Glucose 99 70 - 99 mg/dL   Lyme Disease Total Abs Bld with Reflex to Confirm CLIA   Result Value Ref Range    Lyme Disease Antibodies Total 0.35 <0.90   Ehrlichia Anaplasma Sp by PCR   Result Value Ref Range    Anaplasma phagocytophilum Not Detected     Ehrlichia chaffeensis Not Detected     Ehrlichia ewingii/canis Not Detected     Ehrlichia muris-like Not Detected    EKG 12-lead, tracing only   Result Value Ref Range    Systolic Blood Pressure  mmHg    Diastolic Blood Pressure  mmHg    Ventricular Rate 59 BPM    Atrial Rate 59 BPM    NC Interval 178 ms    QRS Duration 88 ms     ms    QTc 390 ms    P Axis 47 degrees    R AXIS -2 degrees    T Axis 14 degrees    Interpretation ECG       Sinus bradycardia  Otherwise normal ECG  No previous ECGs available  Confirmed by MD PALMER, [facility] ([id number]) on 11/7/2023 1:39:44 PM       MR Brain w/o & w Contrast   Final Result   IMPRESSION:   1. No evidence of acute infarction or intracranial hemorrhage.   2. Mild presumed small vessel ischemic disease changes.      CAL MAURO MD            SYSTEM ID:  [id number]      XR Chest Port 1 View   Final Result   IMPRESSION:  No consolidation or edema.        DERRICK LOTT MD            SYSTEM ID:  [id number]      CT Head Perfusion w Contrast - For Tier 2 Stroke   Final Result   IMPRESSION:    No evidence of ischemia or acute infarction on the CT Perfusion   examination.      [Result: No acute abnormality on CT head perfusion]      Finding was identified on 11/7/2023 12:58 PM.       Provider Derrick Lancaster PA-C discussed received results over the   phone from Dr. Mauro at 11/7/2023 12:59 PM and verbalized   understanding of the result.       CAL MAURO MD            SYSTEM ID:  [id number]      CTA Head Neck with Contrast   Final Result   IMPRESSION:       CTA head: No acute findings.   CTA neck: No acute findings. Descending aorta measures up  to 4.0 cm,   borderline aneurysmal. Consider follow-up in one year to evaluate for   change.      [Result: No acute abnormality on CT head and CTA head and neck]      Finding was identified on 11/7/2023 12:43 PM.      Provider Lalo Lancaster PA-C discussed results over the phone with   Dr. Mauro at 11/7/2023 12:50 PM and verbalized understanding of the   result.        CAL MARUO MD            SYSTEM ID:  [id number]      CT Head w/o Contrast   Final Result   IMPRESSION: Negative Head CT      RHONDA SLADE MD            SYSTEM ID:  [id number]            EKG:    No prior EKG available for comparison. EKG reviewed at 1159.  1) Rhythm: Sinus bradycardia  2) Rate: ventricular rate 59 bpm.  3) QRS Axis: No axis deviation  4) P waves/ TN interval: Sinus. Nml JORDAN. No atrial enlargement  5) QRS complex: Narrow. No BBB. No ventricular hypertrophy. No pathological Q waves.  6) ST Segment: No acute ST segment elevation or depression  7) T waves: No T wave inversions. No peaked or flattened T waves.     I have independently reviewed and interpreted today's EKG, pending cardiologist over read.           I, Marquis Lancaster PA-C, personally performed the services described in this documentation, and it is both accurate and complete.       Lalo Lancaster PA-C  11/12/23 [id number]

## 2023-11-07 NOTE — ED TRIAGE NOTES
Pt presents to ED with c/o dizziness and high blood pressure that started overnight. Pt is on eliquis due to afib. Pt denies H/A, chest pain and SOB. Pt denies hitting his head. Pt reports dizziness is constant and nothing seems to make it better, slightly worse upon standing.    Sneha Lala RN on 11/7/2023 at 11:32 AM       Triage Assessment (Adult)       Row Name 11/07/23 1130          Respiratory WDL    Respiratory WDL WDL  denies SOB        Skin Circulation/Temperature WDL    Skin Circulation/Temperature WDL WDL        Cardiac WDL    Cardiac WDL --  denies chest pain, H/o Afib        Cognitive/Neuro/Behavioral WDL    Cognitive/Neuro/Behavioral WDL X  dizziness

## 2023-11-09 LAB — B BURGDOR IGG+IGM SER QL: 0.35

## 2023-11-09 NOTE — RESULT ENCOUNTER NOTE
Final result for Lyme Disease Total Abs Bld with Reflex to Confirm CLIA is NEGATIVE.    No change in treatment per [facility] Lab Result Lyme Disease protocol.

## 2023-11-11 LAB
A PHAGOCYTOPH DNA BLD QL NAA+PROBE: NOT DETECTED
E CHAFFEENSIS DNA BLD QL NAA+PROBE: NOT DETECTED
E EWINGII DNA SPEC QL NAA+PROBE: NOT DETECTED
EHRLICHIA DNA SPEC QL NAA+PROBE: NOT DETECTED

## 2023-11-17 ENCOUNTER — OFFICE VISIT (OUTPATIENT)
Dept: FAMILY MEDICINE | Facility: OTHER | Age: 83
End: 2023-11-17
Attending: PHYSICIAN ASSISTANT
Payer: COMMERCIAL

## 2023-11-17 VITALS
TEMPERATURE: 97.2 F | RESPIRATION RATE: 16 BRPM | DIASTOLIC BLOOD PRESSURE: 86 MMHG | BODY MASS INDEX: 27.43 KG/M2 | WEIGHT: 207 LBS | HEIGHT: 73 IN | SYSTOLIC BLOOD PRESSURE: 142 MMHG | HEART RATE: 72 BPM | OXYGEN SATURATION: 92 %

## 2023-11-17 DIAGNOSIS — I48.91 ATRIAL FIBRILLATION, UNSPECIFIED TYPE (H): ICD-10-CM

## 2023-11-17 DIAGNOSIS — H61.22 IMPACTED CERUMEN OF LEFT EAR: Primary | ICD-10-CM

## 2023-11-17 DIAGNOSIS — R79.89 ELEVATED TSH: ICD-10-CM

## 2023-11-17 DIAGNOSIS — C61 MALIGNANT NEOPLASM OF PROSTATE (H): ICD-10-CM

## 2023-11-17 DIAGNOSIS — I10 BENIGN ESSENTIAL HYPERTENSION: ICD-10-CM

## 2023-11-17 PROCEDURE — 99214 OFFICE O/P EST MOD 30 MIN: CPT | Performed by: PHYSICIAN ASSISTANT

## 2023-11-17 PROCEDURE — G0463 HOSPITAL OUTPT CLINIC VISIT: HCPCS

## 2023-11-17 ASSESSMENT — PAIN SCALES - GENERAL: PAINLEVEL: NO PAIN (0)

## 2023-11-17 NOTE — PATIENT INSTRUCTIONS
Increase Lisinopril to 15 mg (1.5 tablets) daily. Watch blood pressures at home over the next 2-weeks and [facility] or [facility] with your readings.     We will clean/flush out your left ear today as it is full of wax

## 2023-11-17 NOTE — NURSING NOTE
"Chief Complaint   Patient presents with    Hospital F/U       Initial BP (!) 142/86 (BP Location: Right arm, Patient Position: Sitting, Cuff Size: Adult Regular)   Pulse 72   Temp 97.2  F (36.2  C) (Temporal)   Resp 16   Ht 1.854 m (6' 1\")   Wt 93.9 kg (207 lb)   SpO2 92%   BMI 27.31 kg/m   Estimated body mass index is 27.31 kg/m  as calculated from the following:    Height as of this encounter: 1.854 m (6' 1\").    Weight as of this encounter: 93.9 kg (207 lb).  Medication Review: complete    The next two questions are to help us understand your food security.  If you are feeling you need any assistance in this area, we have resources available to support you today.          11/17/2023   SDOH- Food Insecurity   Within the past 12 months, did you worry that your food would run out before you got money to buy more? N   Within the past 12 months, did the food you bought just not last and you didn t have money to get more? N         Health Care Directive:  Patient has a Health Care Directive on file      Dariana Lee LPN      "

## 2023-11-17 NOTE — PROGRESS NOTES
"  Assessment & Plan       ICD-10-CM    1. Impacted cerumen of left ear  H61.22       2. Benign essential hypertension  I10       3. Atrial fibrillation, unspecified type (H)  I48.91       4. Malignant neoplasm of prostate (H)  C61       5. Elevated TSH  R79.89         Cerumen impaction of the left ear, this is quite prominent in nature.  He does report improvement of symptoms after ear flush.  He may certainly utilize Debrox drops at home, strict return precautions reviewed.  HYPERTENSION - Ongoing. Blood pressure is currently elevated. Medication side effects: None. Denies syncope or presyncope. No changes for now.  Increase lisinopril to 15 mg (1.5 tablets) daily.   Medication list reviewed/updated. Refills completed as needed.    Atrial fibrillation is well controlled with current anticoagulation regimen -he is on apixaban 5 mg daily. Denies excessive bruising or bleeding issues.  Medication list reviewed/updated.  [facility]  History of prostate cancer, [facility] and [facility] for [facility]. He has had no recurrence of symptoms.  Elevated TSH in ER of 8.33 on 11/7/23, unclear etiology - could be transient in nature with 2 point rise in less than 6 weeks (URI/illness, stress, etc.). He exhibits no hypothyroidism symptoms, therefore, discussed with patient, will have him follow up closely with [facility]CP/[facility] system in 4-6 weeks for recheck. We also discussed TSH in the 5-10 range can fall in a gray area for treatment, as age adjusted range for patient per UpToDate can be up to 8.5. Colten is in agreement with this plan. Return precautions reviewed.      MED REC REQUIRED  Post Medication Reconciliation Status: discharge medications reconciled, continue medications without change  BMI:   Estimated body mass index is 27.31 kg/m  as calculated from the following:    Height as of this encounter: 1.854 m (6' 1\").    Weight as of this encounter: 93.9 kg (207 lb).   Weight management plan: Discussed healthy " diet and exercise guidelines    See Patient Instructions    Return if symptoms worsen or fail to improve.    [facility]kta Bains PA-C  [facility]    Subjective   Colten is a 83 year old, presenting for the following health issues:  Hospital F/U        11/17/2023     1:50 PM   Additional Questions   Roomed by Dariana OSMAN   Accompanied by Self       History of Present Illness       Reason for visit:  Hospital follow up    He eats 0-1 servings of fruits and vegetables daily.He consumes 0 sweetened beverage(s) daily.He exercises with enough effort to increase his heart rate 9 or less minutes per day.  He exercises with enough effort to increase his heart rate 3 or less days per week.   He is taking medications regularly.     Colten presents to the clinic today to follow-up on his ER visit from 11/7/2023.  He states he presented with elevated blood pressure, dizziness and feeling weak.  He was also noted to have an elevated thyroid level at this time.  His previous TSH on [facility].  He reports that the [facility] had no expressed concerns about this.  He declines any unintentional weight loss or gain, hair loss or gain on related to age, mood changes, skin changes, stool changes or fatigue.  No known history of thyroid pathology.  He declines any chest pain, shortness of breath, palpitations or other neurologic symptoms such as vision changes or headache.  He also declines centralized or peripheral edema.    Vertigo is worse with positional changes such as rapid head motions looking over the left shoulder and going from sitting to standing.    ED/UC Followup:    Facility:  [facility]  Date of visit: 11/07/23  Reason for visit: Hypertension, dizziness  Current Status: improved, currently dizzy    Review of Systems   Constitutional, HEENT, cardiovascular, pulmonary, GI, , musculoskeletal, neuro, skin, endocrine and psych systems are negative, except as otherwise  "noted.        Objective    BP (!) 142/86 (BP Location: Right arm, Patient Position: Sitting, Cuff Size: Adult Regular)   Pulse 72   Temp 97.2  F (36.2  C) (Temporal)   Resp 16   Ht 1.854 m (6' 1\")   Wt 93.9 kg (207 lb)   SpO2 92%   BMI 27.31 kg/m    Body mass index is 27.31 kg/m .  Physical Exam   GENERAL: healthy, alert and no distress  EYES: Eyes grossly normal to inspection, PERRL and conjunctivae and sclerae normal  HENT: normal cephalic/atraumatic, right ear: normal: no effusions, no erythema, normal landmarks, left ear: occluded with wax, nose and mouth without ulcers or lesions, oropharynx clear, and oral mucous membranes moist  NECK: no adenopathy, no asymmetry, masses, or scars and thyroid normal to palpation  RESP: lungs clear to auscultation - no rales, rhonchi or wheezes  CV: regular rate and rhythm, normal S1 S2, no S3 or S4, no murmur, click or rub, no peripheral edema and peripheral pulses strong  SKIN: no suspicious lesions or rashes  PSYCH: mentation appears normal, affect normal/bright  LYMPH: normal ant/post cervical, supraclavicular nodes        "

## 2024-06-06 ENCOUNTER — HOSPITAL ENCOUNTER (EMERGENCY)
Facility: OTHER | Age: 84
Discharge: HOME | End: 2024-06-06
Payer: MEDICARE

## 2024-06-06 VITALS
SYSTOLIC BLOOD PRESSURE: 128 MMHG | HEIGHT: 73 IN | OXYGEN SATURATION: 96 % | TEMPERATURE: 97.5 F | WEIGHT: 200 LBS | HEART RATE: 89 BPM | BODY MASS INDEX: 26.51 KG/M2 | DIASTOLIC BLOOD PRESSURE: 85 MMHG | RESPIRATION RATE: 18 BRPM

## 2024-06-06 ASSESSMENT — COLUMBIA-SUICIDE SEVERITY RATING SCALE - C-SSRS
6. HAVE YOU EVER DONE ANYTHING, STARTED TO DO ANYTHING, OR PREPARED TO DO ANYTHING TO END YOUR LIFE?: NO
2. HAVE YOU ACTUALLY HAD ANY THOUGHTS OF KILLING YOURSELF IN THE PAST MONTH?: NO
1. IN THE PAST MONTH, HAVE YOU WISHED YOU WERE DEAD OR WISHED YOU COULD GO TO SLEEP AND NOT WAKE UP?: NO

## 2024-06-06 NOTE — ED TRIAGE NOTES
Patient arrived via POV, c/o constipation that started today at 1200. States he has been having discomfort due to not being able to have a bowel movement. Did not try any at home treatments. Denies N/V. Alert, ambulatory.      Triage Assessment (Adult)       Row Name 06/06/24 8834          Triage Assessment    Airway WDL WDL     Additional Documentation Breath Sounds (Group)        Respiratory WDL    Respiratory WDL WDL        Breath Sounds    Breath Sounds All Fields     All Lung Fields Breath Sounds Anterior:;equal bilaterally        Skin Circulation/Temperature WDL    Skin Circulation/Temperature WDL WDL        Cardiac WDL    Cardiac WDL WDL        Peripheral/Neurovascular WDL    Peripheral Neurovascular WDL WDL        Cognitive/Neuro/Behavioral WDL    Cognitive/Neuro/Behavioral WDL WDL

## 2024-07-07 ENCOUNTER — HEALTH MAINTENANCE LETTER (OUTPATIENT)
Age: 84
End: 2024-07-07

## 2024-09-18 ENCOUNTER — MEDICAL CORRESPONDENCE (OUTPATIENT)
Dept: HEALTH INFORMATION MANAGEMENT | Facility: OTHER | Age: 84
End: 2024-09-18
Payer: COMMERCIAL

## 2024-09-23 ENCOUNTER — DOCUMENTATION ONLY (OUTPATIENT)
Dept: UROLOGY | Facility: OTHER | Age: 84
End: 2024-09-23
Payer: COMMERCIAL

## 2024-09-23 DIAGNOSIS — R39.15 URINARY URGENCY: Primary | ICD-10-CM

## 2024-09-23 NOTE — PROGRESS NOTES
Po Blanton has an upcoming lab appointment and there are no orders available. Please review and place future orders, as appropriate.    Chinyere Donato

## 2024-09-27 DIAGNOSIS — R97.20 ELEVATED PROSTATE SPECIFIC ANTIGEN (PSA): Primary | ICD-10-CM

## 2024-10-01 ENCOUNTER — VIRTUAL VISIT (OUTPATIENT)
Dept: UROLOGY | Facility: OTHER | Age: 84
End: 2024-10-01
Attending: UROLOGY
Payer: MEDICARE

## 2024-10-01 ENCOUNTER — LAB (OUTPATIENT)
Dept: LAB | Facility: OTHER | Age: 84
End: 2024-10-01
Attending: UROLOGY
Payer: MEDICARE

## 2024-10-01 VITALS
HEIGHT: 73 IN | OXYGEN SATURATION: 92 % | BODY MASS INDEX: 27.04 KG/M2 | HEART RATE: 80 BPM | WEIGHT: 204 LBS | RESPIRATION RATE: 20 BRPM

## 2024-10-01 DIAGNOSIS — R97.21 RISING PSA FOLLOWING TREATMENT FOR MALIGNANT NEOPLASM OF PROSTATE: ICD-10-CM

## 2024-10-01 DIAGNOSIS — R31.0 GROSS HEMATURIA: ICD-10-CM

## 2024-10-01 DIAGNOSIS — C61 PROSTATE CANCER (H): Primary | ICD-10-CM

## 2024-10-01 DIAGNOSIS — R39.15 URINARY URGENCY: ICD-10-CM

## 2024-10-01 LAB
ALBUMIN UR-MCNC: NEGATIVE MG/DL
APPEARANCE UR: CLEAR
BILIRUB UR QL STRIP: NEGATIVE
COLOR UR AUTO: YELLOW
GLUCOSE UR STRIP-MCNC: NEGATIVE MG/DL
HGB UR QL STRIP: NEGATIVE
KETONES UR STRIP-MCNC: NEGATIVE MG/DL
LEUKOCYTE ESTERASE UR QL STRIP: NEGATIVE
NITRATE UR QL: NEGATIVE
PH UR STRIP: 6.5 [PH] (ref 5–9)
SP GR UR STRIP: 1.02 (ref 1–1.03)
UROBILINOGEN UR STRIP-MCNC: NORMAL MG/DL

## 2024-10-01 PROCEDURE — 81003 URINALYSIS AUTO W/O SCOPE: CPT | Mod: ZL

## 2024-10-01 PROCEDURE — 99214 OFFICE O/P EST MOD 30 MIN: CPT | Mod: 95 | Performed by: UROLOGY

## 2024-10-01 PROCEDURE — G0463 HOSPITAL OUTPT CLINIC VISIT: HCPCS | Mod: 25,GT

## 2024-10-01 PROCEDURE — 88112 CYTOPATH CELL ENHANCE TECH: CPT

## 2024-10-01 PROCEDURE — 51798 US URINE CAPACITY MEASURE: CPT | Mod: GT,95 | Performed by: UROLOGY

## 2024-10-01 NOTE — NURSING NOTE
"Chief Complaint   Patient presents with    Consult     Elevated PSA        Initial Pulse 80   Resp 20   Ht 1.854 m (6' 1\")   Wt 92.5 kg (204 lb)   SpO2 92%   BMI 26.91 kg/m   Estimated body mass index is 26.91 kg/m  as calculated from the following:    Height as of this encounter: 1.854 m (6' 1\").    Weight as of this encounter: 92.5 kg (204 lb).  Medication Reconciliation: complete  AUA- 3  post void residual - 3 ml    Julissa Lopez LPN    "

## 2024-10-01 NOTE — PROGRESS NOTES
Type of service:  Video Visit   Video Visit Start Time: 0247  Video Visit End Time: 3:15     Originating Location (pt. Location): [facility]  Distant Location (provider location): [place], [place]  Platform used for Video Visit: Epic Virtual Visit Platform    I have reviewed the note as documented above.  This accurately captures the substance of my virtual visit with the patient. The patient states an understanding and is agreeable with the plan.   Abraham Reina MD   Urology     Chief Complaint: Consult (Elevated PSA )  .    HPI: Mr. Po Blanton is a 84 year old year old male presenting today October 1, 2024 virtually in follow up for evaluation of prostate cancer status post EBRT in 2013.    Prostate cancer was intermediate risk [place] 3+4=7 diagnosed in January 2013 for an elevated PSA.    History of TURP remotely in the past around 2012.     He has had a rise in his PSA most recently being seen by Dr. Moore in January 2023.    Recent PSA 9.36 in September 2024.  Prior to that his PSA was 2.35 on 1/9/2023.  He has not been seen by any urologist since that time.    History of urinary retention from underlying BPH with successful voiding trial.  Patient is on no medicine for his prostate.    History of gross hematuria status post cystoscopy with Dr. Moore in 2023 demonstrating TURP defect with prostate regrowth without tumor stones or lesions.    Denies voiding symptoms currently.  Positive for gross hematuria about 2 months ago.       Current Outpatient Medications   Medication Sig Dispense Refill    APIXABAN PO Take 5 mg by mouth daily       lisinopril (ZESTRIL) 10 MG tablet Take 10 mg by mouth daily      multivitamin, therapeutic with minerals (THERA-VIT-M) TABS tablet Take 1 tablet by mouth daily      sodium chloride (OCEAN) 0.65 % nasal spray Spray 1 spray into both nostrils as needed for congestion      triamcinolone (KENALOG) 0.1 % cream Apply topically 2 times daily as needed  "for irritation         ALLERGIES: Patient has no known allergies.     GENERAL PHYSICAL EXAM:   Vitals: Pulse 80   Resp 20   Ht 1.854 m (6' 1\")   Wt 92.5 kg (204 lb)   SpO2 92%   BMI 26.91 kg/m    Body mass index is 26.91 kg/m .    GENERAL: Well groomed, well developed, well nourished male in NAD.  ENT:  ENT exam normal  RESPIRATORY: Normal respiratory effort.   MS: Visibly moving all four   NEURO: Alert and oriented x 3.  PSYCH: Normal mood and affect, pleasant and agreeable during interview and exam.    PVR: Residual urine by ultrasound was 0 ml.     AUA- 3  RADIOLOGY: The following tests were reviewed: none    LABS: The last test results for Mr. Po Blanton were reviewed:  No results found for this or any previous visit (from the past 24 hour(s)).    PSA -   Lab Results   Component Value Date    PSA 2.35 01/09/2023     BMP -   Recent Labs   Lab Test 11/07/23  1303 11/07/23  1218 11/07/23  1150 12/31/22  1646     --  138 134*   POTASSIUM 5.1  --  4.5 4.3   CHLORIDE 103  --  103 101   CO2 26  --  26 25   BUN 16.6  --  17.1 14.5   CR 1.10  --  1.14 1.04   GLC 99 80 97 100*   VIRGIL 9.4  --  9.7 9.5   MAG  --   --  2.0  --        CBC -   Recent Labs   Lab Test 11/07/23  1150 12/31/22  1646   WBC 3.9* 4.5   HGB 14.9 14.4    175       ASSESSMENT:   Prostate cancer status post EBRT  Rising PSA following prostate cancer treatment.  Gross Hematuria   History of BPH with urinary retention    PLAN:   Patient with a history of prostate cancer diagnosed by Dr. Jasmine on or about 2012 for which he received radiation treatment.    History of BPH prior to that for which he had received or undergone a TURP.    PSA has risen rather abruptly over the last year which is a concern for recurrence.    Recommendation would be for pet imaging with Pylarify scan.    Regarding his gross hematuria that will need to be worked up with cystoscopy.  We may need to consider CT urogram for imaging of his upper tracts " however given the need for his PET scan we will defer until later.    I do recommend cytology given the risk of bladder cancer with previous radiation treatment.    Ultimately may need referral to oncology as well as possible prostate biopsy if disease is felt to be localized to the prostate.    All questions were addressed    30 minutes spent on the date of this encounter doing chart review, history and exam, documentation and further activities as noted above.        Abraham Reina MD  [facility]rology

## 2024-10-03 LAB — PATH REPORT.FINAL DX SPEC: NORMAL

## 2024-11-07 ENCOUNTER — TELEPHONE (OUTPATIENT)
Dept: FAMILY MEDICINE | Facility: OTHER | Age: 84
End: 2024-11-07

## 2024-11-07 ENCOUNTER — OFFICE VISIT (OUTPATIENT)
Dept: FAMILY MEDICINE | Facility: OTHER | Age: 84
End: 2024-11-07
Attending: STUDENT IN AN ORGANIZED HEALTH CARE EDUCATION/TRAINING PROGRAM
Payer: MEDICARE

## 2024-11-07 VITALS
HEART RATE: 64 BPM | DIASTOLIC BLOOD PRESSURE: 68 MMHG | TEMPERATURE: 98.4 F | HEIGHT: 73 IN | SYSTOLIC BLOOD PRESSURE: 120 MMHG | RESPIRATION RATE: 22 BRPM | BODY MASS INDEX: 26.56 KG/M2 | WEIGHT: 200.4 LBS | OXYGEN SATURATION: 98 %

## 2024-11-07 DIAGNOSIS — U07.1 COVID-19: Primary | ICD-10-CM

## 2024-11-07 DIAGNOSIS — J06.9 UPPER RESPIRATORY TRACT INFECTION, UNSPECIFIED TYPE: ICD-10-CM

## 2024-11-07 LAB
FLUAV RNA SPEC QL NAA+PROBE: NEGATIVE
FLUBV RNA RESP QL NAA+PROBE: NEGATIVE
RSV RNA SPEC NAA+PROBE: NEGATIVE
SARS-COV-2 RNA RESP QL NAA+PROBE: POSITIVE

## 2024-11-07 PROCEDURE — 99213 OFFICE O/P EST LOW 20 MIN: CPT | Performed by: STUDENT IN AN ORGANIZED HEALTH CARE EDUCATION/TRAINING PROGRAM

## 2024-11-07 PROCEDURE — G0463 HOSPITAL OUTPT CLINIC VISIT: HCPCS

## 2024-11-07 PROCEDURE — 87637 SARSCOV2&INF A&B&RSV AMP PRB: CPT | Mod: ZL | Performed by: STUDENT IN AN ORGANIZED HEALTH CARE EDUCATION/TRAINING PROGRAM

## 2024-11-07 ASSESSMENT — PAIN SCALES - GENERAL: PAINLEVEL_OUTOF10: NO PAIN (0)

## 2024-11-07 NOTE — PROGRESS NOTES
"  Assessment & Plan     (U07.1) COVID-19  (primary encounter diagnosis)    Comment: Positive for COVID-19.  Symptoms started about 5 days ago.  No indication at this time for antiviral therapy.  Vital signs are stable.  No evidence of pneumonia with history, exam.  No evidence of sinus infection at this time.  He overall appears very well.    Plan: Continue over-the-counter management.  Follow-up with PCP for any persisting symptoms.  Return to rapid clinic or ER for any worsening or changing symptoms.  He is comfortable and agreeable with this plan.    (J06.9) Upper respiratory tract infection, unspecified type  Comment: COVID-positive.  Plan: Symptomatic Influenza A/B, RSV, & SARS-CoV2 PCR        (COVID-19) Sylvain Abel is a 84 year old, presenting for the following health issues:  Cough and Nasal Congestion    HPI    Patient presents today with concerns of a 5-day history of cough and congestion.  States mild cough, intermittent nasal congestion.  Mild sore throat.  He did take two at home COVID test yesterday which one of the tests was negative and the other test was positive.  He has been using over-the-counter medications including DayQuil, NyQuil.  He has been drinking lots of fluids.  Resting.      Review of Systems  Constitutional, HEENT, cardiovascular, pulmonary, gi and gu systems are negative, except as otherwise noted.        Objective    /68   Pulse 64   Temp 98.4  F (36.9  C) (Tympanic)   Resp 22   Ht 1.854 m (6' 1\")   Wt 90.9 kg (200 lb 6.4 oz)   SpO2 98%   BMI 26.44 kg/m    Body mass index is 26.44 kg/m .    Physical Exam   GENERAL: alert and no distress  EYES: Eyes grossly normal to inspection, PERRL and conjunctivae and sclerae normal  HENT: ear canals and TM's normal, nose and mouth without ulcers or lesions  NECK: no adenopathy, no asymmetry, masses, or scars  RESP: lungs clear to auscultation - no rales, rhonchi or wheezes  CV: regular rate and rhythm, normal " S1 S2  MS: no gross musculoskeletal defects noted, no edema    Results for orders placed or performed in visit on 11/07/24   Symptomatic Influenza A/B, RSV, & SARS-CoV2 PCR (COVID-19) Nose     Status: Abnormal    Specimen: Nose; Swab   Result Value Ref Range    Influenza A PCR Negative Negative    Influenza B PCR Negative Negative    RSV PCR Negative Negative    SARS CoV2 PCR Positive (A) Negative    Narrative    Testing was performed using the Xpert Xpress CoV2/Flu/RSV Assay on the [employer] GeneXpert Instrument. This test should be ordered for the detection of SARS-CoV2, influenza, and RSV viruses in individuals with signs and symptoms of respiratory tract infection. This test is for in vitro diagnostic use under the [place] FDA for laboratories certified under CLIA to perform high or moderate complexity testing. This test has been [place] FDA cleared. A negative result does not rule out the presence of PCR inhibitors in the specimen or target RNA in concentration below the limit of detection for the assay. If only one viral target is positive but coinfection with multiple targets is suspected, the sample should be re-tested with another FDA cleared, approved, or authorized test, if coninfection would change clinical management. This test was validated by the [facility]. These laboratories are certified under the Clinical Laboratory Improvement Amendments of 1988 (CLIA-88) as qualified to perfom high complexity laboratory testing.         Signed Electronically by: Coral Barba PA-C

## 2024-11-07 NOTE — TELEPHONE ENCOUNTER
Spoke with patient. He was requesting his Covid results, which are positive. Patient was not interested in getting an antiviral medication at this time. He states he will just stay away from people. All questions were answered at this time.  Nelly Xie RN on 11/7/2024 at 4:55 PM

## 2024-11-07 NOTE — NURSING NOTE
"Chief Complaint   Patient presents with    Cough    Nasal Congestion     Patient here for congested with cough x4 days. He took 2 home covid tests last night, one was positive and one was negative. Notes that the testes are outdated.       Initial /68   Pulse 64   Temp 98.4  F (36.9  C) (Tympanic)   Resp 22   Ht 1.854 m (6' 1\")   Wt 90.9 kg (200 lb 6.4 oz)   SpO2 98%   BMI 26.44 kg/m   Estimated body mass index is 26.44 kg/m  as calculated from the following:    Height as of this encounter: 1.854 m (6' 1\").    Weight as of this encounter: 90.9 kg (200 lb 6.4 oz).  Medication Review: complete    The next two questions are to help us understand your food security.  If you are feeling you need any assistance in this area, we have resources available to support you today.          11/17/2023   SDOH- Food Insecurity   Within the past 12 months, did you worry that your food would run out before you got money to buy more? N    Within the past 12 months, did the food you bought just not last and you didn t have money to get more? N        Patient-reported         Health Care Directive:  Patient has a Health Care Directive on file      Teressa White LPN      "

## 2024-11-13 ENCOUNTER — HOSPITAL ENCOUNTER (OUTPATIENT)
Dept: PET IMAGING | Facility: OTHER | Age: 84
Discharge: HOME | End: 2024-11-13
Attending: UROLOGY | Admitting: UROLOGY
Payer: MEDICARE

## 2024-11-13 DIAGNOSIS — R97.21 RISING PSA FOLLOWING TREATMENT FOR MALIGNANT NEOPLASM OF PROSTATE: ICD-10-CM

## 2024-11-13 DIAGNOSIS — C61 PROSTATE CANCER (H): ICD-10-CM

## 2024-11-13 PROCEDURE — 343N000001 HC RX 343 MED OP 636: Mod: JZ | Performed by: UROLOGY

## 2024-11-13 PROCEDURE — G1010 CDSM STANSON: HCPCS | Mod: PI

## 2024-11-13 PROCEDURE — A9595 HC RX 343 MED OP 636: HCPCS | Mod: JZ | Performed by: UROLOGY

## 2024-11-13 RX ADMIN — PIFLUFOLASTAT F-18 8.54 MILLICURIE: 80 INJECTION INTRAVENOUS at 17:19

## 2024-11-14 ENCOUNTER — PATIENT OUTREACH (OUTPATIENT)
Dept: ONCOLOGY | Facility: OTHER | Age: 84
End: 2024-11-14
Payer: COMMERCIAL

## 2024-11-14 DIAGNOSIS — C61 METASTATIC CASTRATION-SENSITIVE ADENOCARCINOMA OF PROSTATE (H): Primary | ICD-10-CM

## 2024-11-14 DIAGNOSIS — Z19.1 METASTATIC CASTRATION-SENSITIVE ADENOCARCINOMA OF PROSTATE (H): Primary | ICD-10-CM

## 2024-11-14 NOTE — RESULT ENCOUNTER NOTE
Positive PET scan with mets to left 8th rib and prostate.  Patient notified. Referral to Medical Oncology to discuss treatment.  Martin

## 2024-11-14 NOTE — RESULT ENCOUNTER NOTE
Dr. Herrera, history of prostate cancer with EBRT with rising PSA.  PET positive with osseous mets to left 8th rib and activity remaining in prostate.    I don't believe he warrants another biopsy to confirm but rather treatment.    May I refer to you for consultation?    Thanks, Martin Reina

## 2024-11-14 NOTE — PROGRESS NOTES
Oncology/Hematology Care Coordination - Referral Review    Referred by:  Dr. Reina    Diagnosis:  metastatic castration-sensitive adenocarcinoma of prostate    prostate cancer status post EBRT in 2013.     History of TURP remotely in the past around 2012.      He has had a rise in his PSA most recently being seen by Dr. Moore in January 2023.     Recent PSA 9.36 in September 2024.  Prior to that his PSA was 2.35 on 1/9/2023.  He has not been seen by any urologist since that time.     History of gross hematuria status post cystoscopy with Dr. Moore in 2023 demonstrating TURP defect with prostate regrowth without tumor stones or lesions.      Most recent Imaging:  PET 11/13/24    Most recent Lab:      Surgery/Biopsy:  urine cytology 10/11/24    Pathology:      Outside Records:      Antonia Solano RN on 11/14/2024 at 4:13 PM Dr. Reina

## 2024-11-18 ENCOUNTER — TELEPHONE (OUTPATIENT)
Dept: UROLOGY | Facility: OTHER | Age: 84
End: 2024-11-18
Payer: COMMERCIAL

## 2024-11-18 DIAGNOSIS — C61 PROSTATE CANCER (H): Primary | ICD-10-CM

## 2024-11-19 ENCOUNTER — ONCOLOGY VISIT (OUTPATIENT)
Dept: ONCOLOGY | Facility: OTHER | Age: 84
End: 2024-11-19
Attending: INTERNAL MEDICINE
Payer: MEDICARE

## 2024-11-19 VITALS
OXYGEN SATURATION: 95 % | RESPIRATION RATE: 16 BRPM | DIASTOLIC BLOOD PRESSURE: 65 MMHG | SYSTOLIC BLOOD PRESSURE: 123 MMHG | BODY MASS INDEX: 26.65 KG/M2 | HEART RATE: 63 BPM | TEMPERATURE: 97.7 F | WEIGHT: 202 LBS

## 2024-11-19 DIAGNOSIS — Z19.1 METASTATIC CASTRATION-SENSITIVE ADENOCARCINOMA OF PROSTATE (H): Primary | ICD-10-CM

## 2024-11-19 DIAGNOSIS — C61 METASTATIC CASTRATION-SENSITIVE ADENOCARCINOMA OF PROSTATE (H): Primary | ICD-10-CM

## 2024-11-19 DIAGNOSIS — Z01.89 ENCOUNTER FOR ROUTINE LABORATORY TESTING: ICD-10-CM

## 2024-11-19 LAB
ALBUMIN SERPL BCG-MCNC: 4.2 G/DL (ref 3.5–5.2)
ALP SERPL-CCNC: 80 U/L (ref 40–150)
ALT SERPL W P-5'-P-CCNC: 21 U/L (ref 0–70)
ANION GAP SERPL CALCULATED.3IONS-SCNC: 8 MMOL/L (ref 7–15)
AST SERPL W P-5'-P-CCNC: 25 U/L (ref 0–45)
BASOPHILS # BLD AUTO: 0 10E3/UL (ref 0–0.2)
BASOPHILS NFR BLD AUTO: 1 %
BILIRUB SERPL-MCNC: 0.8 MG/DL
BUN SERPL-MCNC: 19.5 MG/DL (ref 8–23)
CALCIUM SERPL-MCNC: 9.1 MG/DL (ref 8.8–10.4)
CHLORIDE SERPL-SCNC: 104 MMOL/L (ref 98–107)
CREAT SERPL-MCNC: 1.27 MG/DL (ref 0.67–1.17)
EGFRCR SERPLBLD CKD-EPI 2021: 56 ML/MIN/1.73M2
EOSINOPHIL # BLD AUTO: 0.2 10E3/UL (ref 0–0.7)
EOSINOPHIL NFR BLD AUTO: 4 %
ERYTHROCYTE [DISTWIDTH] IN BLOOD BY AUTOMATED COUNT: 13 % (ref 10–15)
GLUCOSE SERPL-MCNC: 101 MG/DL (ref 70–99)
HCO3 SERPL-SCNC: 25 MMOL/L (ref 22–29)
HCT VFR BLD AUTO: 40.3 % (ref 40–53)
HGB BLD-MCNC: 13.6 G/DL (ref 13.3–17.7)
IMM GRANULOCYTES # BLD: 0 10E3/UL
IMM GRANULOCYTES NFR BLD: 0 %
LDH SERPL L TO P-CCNC: 189 U/L (ref 0–250)
LYMPHOCYTES # BLD AUTO: 0.9 10E3/UL (ref 0.8–5.3)
LYMPHOCYTES NFR BLD AUTO: 18 %
MCH RBC QN AUTO: 29.7 PG (ref 26.5–33)
MCHC RBC AUTO-ENTMCNC: 33.7 G/DL (ref 31.5–36.5)
MCV RBC AUTO: 88 FL (ref 78–100)
MONOCYTES # BLD AUTO: 0.5 10E3/UL (ref 0–1.3)
MONOCYTES NFR BLD AUTO: 9 %
NEUTROPHILS # BLD AUTO: 3.6 10E3/UL (ref 1.6–8.3)
NEUTROPHILS NFR BLD AUTO: 69 %
NRBC # BLD AUTO: 0 10E3/UL
NRBC BLD AUTO-RTO: 0 /100
PLATELET # BLD AUTO: 202 10E3/UL (ref 150–450)
POTASSIUM SERPL-SCNC: 4.8 MMOL/L (ref 3.4–5.3)
PROT SERPL-MCNC: 7.2 G/DL (ref 6.4–8.3)
PSA SERPL DL<=0.01 NG/ML-MCNC: 14.95 NG/ML
RBC # BLD AUTO: 4.58 10E6/UL (ref 4.4–5.9)
SODIUM SERPL-SCNC: 137 MMOL/L (ref 135–145)
WBC # BLD AUTO: 5.1 10E3/UL (ref 4–11)

## 2024-11-19 PROCEDURE — 80053 COMPREHEN METABOLIC PANEL: CPT | Mod: ZL | Performed by: INTERNAL MEDICINE

## 2024-11-19 PROCEDURE — 99000 SPECIMEN HANDLING OFFICE-LAB: CPT | Performed by: INTERNAL MEDICINE

## 2024-11-19 PROCEDURE — 36415 COLL VENOUS BLD VENIPUNCTURE: CPT | Mod: ZL | Performed by: INTERNAL MEDICINE

## 2024-11-19 PROCEDURE — 83615 LACTATE (LD) (LDH) ENZYME: CPT | Mod: ZL | Performed by: INTERNAL MEDICINE

## 2024-11-19 PROCEDURE — 85025 COMPLETE CBC W/AUTO DIFF WBC: CPT | Mod: ZL | Performed by: INTERNAL MEDICINE

## 2024-11-19 PROCEDURE — 82040 ASSAY OF SERUM ALBUMIN: CPT | Mod: ZL | Performed by: INTERNAL MEDICINE

## 2024-11-19 PROCEDURE — G0463 HOSPITAL OUTPT CLINIC VISIT: HCPCS

## 2024-11-19 PROCEDURE — 84153 ASSAY OF PSA TOTAL: CPT | Mod: ZL | Performed by: INTERNAL MEDICINE

## 2024-11-19 PROCEDURE — 80051 ELECTROLYTE PANEL: CPT | Mod: ZL | Performed by: INTERNAL MEDICINE

## 2024-11-19 RX ORDER — DIPHENHYDRAMINE HYDROCHLORIDE 50 MG/ML
25 INJECTION, SOLUTION INTRAMUSCULAR; INTRAVENOUS
Status: CANCELLED
Start: 2024-12-03

## 2024-11-19 RX ORDER — ALBUTEROL SULFATE 90 UG/1
1-2 INHALANT RESPIRATORY (INHALATION)
Status: CANCELLED
Start: 2024-12-03

## 2024-11-19 RX ORDER — DIPHENHYDRAMINE HYDROCHLORIDE 50 MG/ML
50 INJECTION, SOLUTION INTRAMUSCULAR; INTRAVENOUS
Status: CANCELLED
Start: 2024-12-03

## 2024-11-19 RX ORDER — METHYLPREDNISOLONE SODIUM SUCCINATE 40 MG/ML
40 INJECTION INTRAMUSCULAR; INTRAVENOUS
Status: CANCELLED
Start: 2024-12-03

## 2024-11-19 RX ORDER — MEPERIDINE HYDROCHLORIDE 25 MG/ML
25 INJECTION INTRAMUSCULAR; INTRAVENOUS; SUBCUTANEOUS
Status: CANCELLED | OUTPATIENT
Start: 2024-12-03

## 2024-11-19 RX ORDER — ALBUTEROL SULFATE 0.83 MG/ML
2.5 SOLUTION RESPIRATORY (INHALATION)
Status: CANCELLED | OUTPATIENT
Start: 2024-12-03

## 2024-11-19 RX ORDER — EPINEPHRINE 1 MG/ML
0.3 INJECTION, SOLUTION, CONCENTRATE INTRAVENOUS EVERY 5 MIN PRN
Status: CANCELLED | OUTPATIENT
Start: 2024-12-03

## 2024-11-19 ASSESSMENT — PAIN SCALES - GENERAL: PAINLEVEL_OUTOF10: NO PAIN (0)

## 2024-11-19 NOTE — NURSING NOTE
"Oncology Rooming Note    November 19, 2024 2:29 PM   Po Blanton is a 84 year old male who presents for:    Chief Complaint   Patient presents with    Oncology Clinic Visit     CONSULT:  Prostate cancer     Initial Vitals: /65   Pulse 63   Temp 97.7  F (36.5  C) (Tympanic)   Resp 16   Wt 91.6 kg (202 lb)   SpO2 95%   BMI 26.65 kg/m   Estimated body mass index is 26.65 kg/m  as calculated from the following:    Height as of 11/7/24: 1.854 m (6' 1\").    Weight as of this encounter: 91.6 kg (202 lb). Body surface area is 2.17 meters squared.  No Pain (0) Comment: Data Unavailable   No LMP for male patient.  Allergies reviewed: Yes  Medications reviewed: Yes    Medications: Medication refills not needed today.  Pharmacy name entered into [employer]: [id number] - [address]    Frailty Screening:   Is the patient here for a new oncology consult visit in cancer care? 1. Yes. Over the past month, have you experienced difficulty or required a caregiver to assist with:   1. Balance, walking or general mobility (including any falls)? NO  2. Completion of self-care tasks such as bathing, dressing, toileting, grooming/hygiene?  NO  3. Concentration or memory that affects your daily life?  NO       Clinical concerns: None       Madison Hamm CMA (AAMA)  "

## 2024-11-19 NOTE — PROGRESS NOTES
[facility] Hematology and Oncology Consult Note    Patient: Po Blanton  MRN: [id number]  Date of Service: Nov 19, 2024       Reason for Visit    I was consulted by Dr. 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        History of Present Illness    Mr. 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  Elected to receive external beam radiotherapy and received 7700 cGy to the prostate and seminal vesicles completed on April 15, 2013.  Radiation was given at [place] [facility].  According the patient his PSA dropped to 0 subsequently.  He is followed by Dr. Moore who performed cystoscopy for gross hematuria in January 2023.  The findings with prior TURP defect was identified with some moderate prostate regrowth otherwise the bladder appeared to be normal capacity there were no tumors stones or foreign bodies.  His hematuria eventually resolved.  His PSA at that time was 2.35.Dr. Moore  left the practice.  The patient was seen by his primary care physician on 2024 who ordered a PSA and it was elevated at 9.36 He was referred to urology and was seen by Dr. Abraham Reina on 2024 and he recommended PET PSMA Pylarify scan.  Given his history of gross hematuria he also recommended urine for cytology.  This was obtained and came back negative PET scan was performed on  and the findings were that there was intensely increased uptake within sclerotic lesion in the posterior aspect of the left eighth rib compatible with osseous metastatic disease.  The prostate was enlarged with asymmetric intensely increased uptake right more than left.  Dr. Reina felt that this was all consistent with recurrent metastatic castrate sensitive prostate cancer.  He felt a biopsy was not necessary to confirm this.  Patient is now here for treatment options.  He is overall asymptomatic denies any rib cage pain or back pain.  He has a history of atrial fibrillation and is currently on Eliquis.  He has not seen a cardiologist and plans to see 1 next week.  He states that he was diagnosed atrial fibrillation after an upper respiratory infection and was placed on Eliquis but has not follow-up with cardiology in at least 10 years.  Denies chest pain or palpitations.  Denies urinary frequency or gross hematuria.  He says he gets occasional urgency.  Denies any bone pain.  Denies shortness of breath cough or hemoptysis.  Denies change in bowel habits.  Denies bright red blood per rectum hematemesis or melena.  Denies fevers, night sweats or weight loss.  He was in the  and was in the [employer] between 1960 and 65 and was stationed in [facility].  There is from family history of cancer mother  of breast cancer age 49 he had a brother with prostate cancer.  He worked for the [employer] most of his life and retired at age 59.    Medications:    Current Outpatient Medications   Medication  Sig Dispense Refill    APIXABAN PO Take 5 mg by mouth daily       Ascorbic Acid (VITAMIN C PO) Take 1 tablet by mouth daily.      Calcium-Magnesium-Zinc 333-133-5 MG TABS per tablet Take 2 tablets by mouth daily.      lisinopril (ZESTRIL) 10 MG tablet Take 10 mg by mouth daily      Multiple Vitamins-Minerals (ONE DAILY MULTIVITAMIN MEN) TABS Take 1 tablet by mouth daily.      Multiple Vitamins-Minerals (PRESERVISION AREDS 2 PO) Take 1 capsule by mouth daily.      Multiple Vitamins-Minerals (ZINC PO) Take 1 tablet by mouth daily.      triamcinolone (KENALOG) 0.1 % cream Apply topically 2 times daily as needed for irritation       No current facility-administered medications for this visit.           Past History    Past Medical History:   Diagnosis Date    Benign neoplasm of colon     30 centimeters that returned as tubular adenoma with low-grade dysplasia.    Esophageal reflux     No Comments Provided    Hypertrophy of prostate without urinary obstruction and other lower urinary tract symptoms (LUTS)     PSA 3.04 10/00;  3.4 ; 5.5 .     Past Surgical History:   Procedure Laterality Date    BIOPSY      biopsy of prostate- needle/punch    COLONOSCOPY  2018    Dr. Humphreys, [facility]    GENITOURINARY SURGERY  2012    TURP    GENITOURINARY SURGERY  2008    cystoscopy    OTHER SURGICAL HISTORY      ,[id number],([place]) FACILITY COLORECTAL CA SCREEN FLEX SCOPE     No Known Allergies  Family History   Problem Relation Age of Onset    Prostate Cancer Father     Cerebrovascular Disease Father     Breast Cancer Mother 49        Cancer-breast,    Other - See Comments Other         He had nine siblings-four sisters and two brothers still living.  Three -one from a motor vehicle accident, one from a stroke, one from a crib death.    Other - See Comments Brother         aneurysm     Social History     Socioeconomic History    Marital status:      Spouse name: None    Number of  children: None    Years of education: None    Highest education level: None   Tobacco Use    Smoking status: Never     Passive exposure: Never    Smokeless tobacco: Never   Vaping Use    Vaping status: Never Used   Substance and Sexual Activity    Alcohol use: Yes     Comment: 3 per week    Drug use: Never   Social History Narrative    .  Retired from [employer] at age 58; he was a .  He has one daughter, Yandy, recently .  Enjoys snowmobiling and outdoor activities    Preloaded 10/29/13     Social Drivers of Health     Financial Resource Strain: Low Risk  (11/17/2023)    Financial Resource Strain     Within the past 12 months, have you or your family members you live with been unable to get utilities (heat, electricity) when it was really needed?: No   Food Insecurity: No Food Insecurity (9/5/2024)    Received from [facility] [employer]    Hunger Vital Sign     Worried About Running Out of Food in the Last Year: Never true     Ran Out of Food in the Last Year: Never true   Transportation Needs: No Transportation Needs (9/5/2024)    Received from [facility] [employer] [facility]    PRAPARE - Transportation     Lack of Transportation (Medical): No     Lack of Transportation (Non-Medical): No   Interpersonal Safety: Low Risk  (11/7/2024)    Interpersonal Safety     Do you feel physically and emotionally safe where you currently live?: Yes     Within the past 12 months, have you been hit, slapped, kicked or otherwise physically hurt by someone?: No     Within the past 12 months, have you been humiliated or emotionally abused in other ways by your partner or ex-partner?: No   Housing Stability: Unknown (9/5/2024)    Received from [facility] [employer] [facility]    Housing Stability Vital Sign     Unable to Pay for Housing in the Last Year: No     Homeless in the Last Year: No             Review of systems.  CNS: There are no  headaches, no blurred vision, no change in mental status,   ENT: There is no hearing loss.  EYES:  No visual complaints.  Respiratory: There is no shortness of breath, hemoptysis, cough  Cardiac: There is no chest pain, orthopnea, PND, or ankle edema.  GI: There is no bright red blood per rectum, no hematemesis, no reflux, no diarrhea or constipation  Musculoskeletal: There is no joint pain, or or myalgias.  : There is no urinary frequency, hematuria.  There is positive urgency  Constitutional: There is no fevers, night sweats, weight loss.  Endocrine: There are are no hot flashes, fatigue.   Neuro: There is no tingling or numbness in the hands or feet.  Hematologic: There is no gingival bleeding, epistaxis, or easy bruisability.  Dermatologic: There is no skin rash.  A 14 point review of systems is otherwise negative.            Physical Exam    /65   Pulse 63   Temp 97.7  F (36.5  C) (Tympanic)   Resp 16   Wt 91.6 kg (202 lb)   SpO2 95%   BMI 26.65 kg/m        GENERAL: Alert and oriented to time place and person. Seated comfortably. In no distress.    HEAD: Atraumatic and normocephalic.    EYES: JAYLA, EOMI.  No pallor.  No icterus.    Oral cavity: no mucosal lesion or tonsillar enlargement.    NECK: supple. JVP normal.  No thyroid enlargement.    LYMPH NODES: No palpable, cervical, axillary or inguinal lymphadenopathy.    CHEST: clear to auscultation bilaterally.  Resonant to percussion throughout bilaterally.  Symmetrical breath movements bilaterally.    CVS: S1 and S2 are heard. Regular rate and rhythm.  No murmur or gallop or rub heard.  No peripheral edema.    ABDOMEN: Soft. Not tender. Not distended.  No palpable hepatomegaly or splenomegaly.  No other mass palpable.  Bowel sounds heard.    EXTREMITIES: No ankle edema.    SKIN: no rash, or bruising or purpura.  Neuro: Grossly nonfocal    Lab Results    Recent Results (from the past 240 hours)   CBC with platelets and differential    Collection  Time: 11/19/24  3:50 PM   Result Value Ref Range    WBC Count 5.1 4.0 - 11.0 10e3/uL    RBC Count 4.58 4.40 - 5.90 10e6/uL    Hemoglobin 13.6 13.3 - 17.7 g/dL    Hematocrit 40.3 40.0 - 53.0 %    MCV 88 78 - 100 fL    MCH 29.7 26.5 - 33.0 pg    MCHC 33.7 31.5 - 36.5 g/dL    RDW 13.0 10.0 - 15.0 %    Platelet Count 202 150 - 450 10e3/uL    % Neutrophils 69 %    % Lymphocytes 18 %    % Monocytes 9 %    % Eosinophils 4 %    % Basophils 1 %    % Immature Granulocytes 0 %    NRBCs per 100 WBC 0 <1 /100    Absolute Neutrophils 3.6 1.6 - 8.3 10e3/uL    Absolute Lymphocytes 0.9 0.8 - 5.3 10e3/uL    Absolute Monocytes 0.5 0.0 - 1.3 10e3/uL    Absolute Eosinophils 0.2 0.0 - 0.7 10e3/uL    Absolute Basophils 0.0 0.0 - 0.2 10e3/uL    Absolute Immature Granulocytes 0.0 <=0.4 10e3/uL    Absolute NRBCs 0.0 10e3/uL       Imaging Results    PET (Eyes to Thighs) Prostate    Result Date: 11/14/2024  Procedure: PET (EYES TO THIGHS) PROSTATE Subtype: Initial Radiopharmaceutical: F-18 PSMA (Pylarify) Dose: 8.54 mCi IV History:   History of prostate cancer status post radiation with rising PSA; Prostate cancer (H); Rising PSA following treatment for malignant neoplasm of prostate Comparison: 2/11/2019 Technique:  A low dose CT examination was performed for the purpose of attenuation correction and localization. Attenuation corrected PET images from the skull base to the mid thigh were acquired approximately one hour following intravenous administration of the dose, and displayed in transaxial, sagittal, and coronal projections. FINDINGS: HEAD/NECK: -No significant abnormal uptake. -No significant findings on the non-contrast CT localizer images. CHEST: -No significant abnormal uptake. -No significant findings on the non-contrast CT localizer images. ABDOMEN/PELVIS: -The prostate is enlarged. There is asymmetric intensely increased uptake in the prostate, right more than left. -There is sigmoid diverticulosis without evidence of acute  diverticulitis. -There is a 2 mm nonobstructing calculus in the lower pole of left kidney. MUSCULOSKELETAL/THIGHS: -There is intensely increased uptake within a sclerotic lesion in the posterior aspect of the left eighth rib, compatible with osseous metastatic disease. -No other abnormal osseous uptake is demonstrated.     IMPRESSION:  There is intensely increased uptake within a sclerotic lesion in the posterior aspect of the left eighth rib compatible with osseous metastatic disease. The prostate is enlarged with asymmetric intensely increased uptake, right more than left. CORTES PALMA MD   SYSTEM ID:  [id number]     Assessment/Plan: #1 :.  Recurrent castrate sensitive prostate cancer with bone metastases in a patient with previous history of [place] 4+ equal 7 prostate cancer treated with EBRT in 2013 with recent rising PSA of 9.36 with PET PSMA scan indicating local recurrence of prostate cancer as well as isolated left posterior eighth rib metastatic bone lesion given the fact that he has oligometastatic disease he would benefit from androgen deprivation therapy/Xgeva/enzalutamide.  This is based on the phase 3 arches trial for metastatic hormone sensitive prostate cancer which randomized patients to receive ADT plus either enzalutamide or placebo patients received enzalutamide at an improved progression free survival of 49.8 months compared to placebo which had a median progression free survival of 38.9 months overall survival was also improved in patients received enzalutamide with median not having yet been reached but approaching 53.3 months therefore we will start the patient on enzalutamide 160 mg p.o. daily, Lupron 22.5 mg IM every 3 months, as well as Xgeva 120 mg subcutaneously monthly we will try to start Lupron as soon as possible.  In terms of his atrial fibrillation he is currently in normal sinus rhythm he may not need to be on Eliquis.  Will defer to cardiology who he sees next week otherwise  the plan is to monitor PSAs testosterone level as well as CBC CMP LDH.  Time spent: 130 minutes was spent on this new patient consultation visit: We spent a significant amount of time reviewing multiple medical records including radiation oncology notes, pathology report, urology notes, multiple imaging results including PSMA PET scan, reviewed treatment options in the literature for metastatic hormone sensitive prostate cancer, we discussed PSMA PET scan results with the patient as well as lab results with the patient we discussed treatment plan with the patient, we performed history/physical, we documented a history/physical, we ordered enzalutamide/Lupron/Xgeva as well as follow-up labs and appointments with plans to see the patient in 2 months and assess PSA response.  The longitudinal plan of care for the diagnosis(es)/condition(s) as documented were addressed during this visit. Due to the added complexity in care, I will continue to support Colten in the subsequent management and with ongoing continuity of care.        Signed by: Dewye Herrera MD

## 2024-11-20 NOTE — TELEPHONE ENCOUNTER
Talked to patient , verbalized understanding of procedure and to be off eliquis 2 days prior to Dec 5 th and off multivitamin with vit E in it. Will eat before and know will need to leave a urine sample. Julissa Lopez LPN ....................11/20/2024  10:39 AM

## 2024-11-21 ENCOUNTER — TELEPHONE (OUTPATIENT)
Dept: ONCOLOGY | Facility: CLINIC | Age: 84
End: 2024-11-21
Payer: COMMERCIAL

## 2024-11-21 NOTE — TELEPHONE ENCOUNTER
Prior Authorization Approval    Medication: XTANDI 80 MG PO TABS  Authorization Effective Date: 8/23/2024  Authorization Expiration Date: 11/21/2025  Approved Dose/Quantity: 60 per 30  Reference #: [id number]   Insurance Company: [employer] - Phone [phone] Fax [phone]  Expected CoPay: $ 2,924.28  CoPay Card Available:      Financial Assistance Needed: yes  Which Pharmacy is filling the prescription:    Pharmacy Notified: no  Patient Notified: yes

## 2024-11-21 NOTE — TELEPHONE ENCOUNTER
PA Initiation    Medication: XTANDI 80 MG PO TABS  Insurance Company:  [employer]  Pharmacy Filling the Rx:    Filling Pharmacy Phone:    Filling Pharmacy Fax:    Start Date: 11/21/2024

## 2024-11-26 ENCOUNTER — TELEPHONE (OUTPATIENT)
Dept: ONCOLOGY | Facility: OTHER | Age: 84
End: 2024-11-26

## 2024-11-26 ENCOUNTER — ALLIED HEALTH/NURSE VISIT (OUTPATIENT)
Dept: UROLOGY | Facility: OTHER | Age: 84
End: 2024-11-26
Attending: UROLOGY
Payer: MEDICARE

## 2024-11-26 DIAGNOSIS — C61 METASTATIC CASTRATION-SENSITIVE ADENOCARCINOMA OF PROSTATE (H): Primary | ICD-10-CM

## 2024-11-26 DIAGNOSIS — Z19.1 METASTATIC CASTRATION-SENSITIVE ADENOCARCINOMA OF PROSTATE (H): Primary | ICD-10-CM

## 2024-11-26 PROCEDURE — 250N000011 HC RX IP 250 OP 636: Mod: JZ | Performed by: INTERNAL MEDICINE

## 2024-11-26 PROCEDURE — 96402 CHEMO HORMON ANTINEOPL SQ/IM: CPT

## 2024-11-26 RX ORDER — PROCHLORPERAZINE MALEATE 10 MG
10 TABLET ORAL EVERY 6 HOURS PRN
Qty: 30 TABLET | Refills: 2 | Status: SHIPPED | OUTPATIENT
Start: 2024-11-26

## 2024-11-26 RX ADMIN — LEUPROLIDE ACETATE 22.5 MG: KIT at 08:15

## 2024-11-26 NOTE — TELEPHONE ENCOUNTER
FREE DRUG APPLICATION APPROVED    Medication: XTANDI 80 MG PO TABS  Program Name:   FPAP  Effective Date:  11/25/24   Expiration Date:  12/31/25  Pharmacy Filling the Rx:    Patient Notified: YES  Additional Information:

## 2024-11-26 NOTE — PROGRESS NOTES
Clinic Administered Medication Documentation        Patient was given Lupron 22.5 mg . Prior to medication administration, verified patient's identity using patient s name and date of birth. Please see MAR and medication order for additional information. Patient instructed to remain in clinic for 15 minutes and report any adverse reaction to staff immediately.    Vial/Syringe: Single dose vial. Was entire vial of medication used? Yes    Return on 2/18/25 for next injection. Rachel Theodore RN on 11/26/2024 at 8:36 AM

## 2024-11-26 NOTE — ORAL ONC MGMT
"Oral Chemotherapy Monitoring Program    Primary Oncologist: Dr. Herrera  Primary Oncology Clinic: [facility]  Cancer Diagnosis: Prostate cancer    Drug: enzalutamide  Start Date: TBD  Expected duration of therapy: Until disease progression or unacceptable toxicity    Drug Interaction Assessment: Chemotherapy regimen has been verified against protocol and patient specific values for safety and appropriateness. (Chemotherapy drugs verified: enzalutamide)     Upon review of medication list, the following potential drug interactions were identified:   Enzalutamide + apixaban: consider alternatives when possible (category D). Apixaban serum concentrations and efficacy may be reduced        - He is currently not in atrial fibrillation and he plans to see his cardiologist next week who will likely take him off the Eliquis. We will monitor closely. Per Dr. Herrera    Lab Monitoring Plan      Subjective/Objective:  Po Blanton is a 84 year old male contacted by phone for an initial visit for oral chemotherapy education. We also reviewed prochlorperazine and it was sent to his local pharmacy.        11/19/2024     3:00 PM 11/26/2024     1:00 PM   ORAL CHEMOTHERAPY   Assessment Type Initial Work up New Teach   Diagnosis Code Prostate Cancer Prostate Cancer   Providers Sharon Herrera   Clinic Name/Location [facility]   Drug Name Xtandi (enzalutamide) Xtandi (enzalutamide)   Dose 160 mg 160 mg   Current Schedule Daily Daily   Cycle Details Continuous Continuous   Any new drug interactions? Yes    Pharmacist Intervention? Yes    Intervention(s) --    Is the dose as ordered appropriate for the patient? Yes        Vitals:  BP:   BP Readings from Last 1 Encounters:   11/19/24 123/65     Wt Readings from Last 1 Encounters:   11/19/24 91.6 kg (202 lb)     Estimated body surface area is 2.17 meters squared as calculated from the following:    Height as of 11/7/24: 1.854 m (6' 1\").    Weight as of 11/19/24: " 91.6 kg (202 lb).    Labs:  _  Result Component Current Result Ref Range   Sodium 137 (11/19/2024) 135 - 145 mmol/L     _  Result Component Current Result Ref Range   Potassium 4.8 (11/19/2024) 3.4 - 5.3 mmol/L     _  Result Component Current Result Ref Range   Calcium 9.1 (11/19/2024) 8.8 - 10.4 mg/dL     No results found for Mag within last 30 days.     No results found for Phos within last 30 days.     _  Result Component Current Result Ref Range   Albumin 4.2 (11/19/2024) 3.5 - 5.2 g/dL     _  Result Component Current Result Ref Range   Urea Nitrogen 19.5 (11/19/2024) 8.0 - 23.0 mg/dL     _  Result Component Current Result Ref Range   Creatinine 1.27 (H) (11/19/2024) 0.67 - 1.17 mg/dL       _  Result Component Current Result Ref Range   AST 25 (11/19/2024) 0 - 45 U/L     _  Result Component Current Result Ref Range   ALT 21 (11/19/2024) 0 - 70 U/L     _  Result Component Current Result Ref Range   Bilirubin Total 0.8 (11/19/2024) <=1.2 mg/dL       _  Result Component Current Result Ref Range   WBC Count 5.1 (11/19/2024) 4.0 - 11.0 10e3/uL     _  Result Component Current Result Ref Range   Hemoglobin 13.6 (11/19/2024) 13.3 - 17.7 g/dL     _  Result Component Current Result Ref Range   Platelet Count 202 (11/19/2024) 150 - 450 10e3/uL     No results found for ANC within last 30 days.         Assessment:  Patient is appropriate to start therapy.    Plan:  Basic chemotherapy teaching was reviewed with the patient including indication, start date of therapy, dose, administration, adverse effects, missed doses, food and drug interactions, monitoring, side effect management, office contact information, and safe handling. Written materials were provided and all questions answered.    Follow-Up:  We will call Colten about one week after he starts treatment for an initial follow-up.     Paul Pandey, PharmD  Oral Chemotherapy Pharmacist  [phone]

## 2024-12-02 LAB — SCANNED LAB RESULT: NORMAL

## 2024-12-05 ENCOUNTER — LAB (OUTPATIENT)
Dept: LAB | Facility: OTHER | Age: 84
End: 2024-12-05
Attending: UROLOGY
Payer: MEDICARE

## 2024-12-05 DIAGNOSIS — C61 PROSTATE CANCER (H): ICD-10-CM

## 2024-12-05 LAB
ALBUMIN UR-MCNC: 30 MG/DL
APPEARANCE UR: CLEAR
BILIRUB UR QL STRIP: NEGATIVE
COLOR UR AUTO: YELLOW
GLUCOSE UR STRIP-MCNC: NEGATIVE MG/DL
HGB UR QL STRIP: NEGATIVE
KETONES UR STRIP-MCNC: NEGATIVE MG/DL
LEUKOCYTE ESTERASE UR QL STRIP: NEGATIVE
NITRATE UR QL: NEGATIVE
PH UR STRIP: 6 [PH] (ref 5–9)
SP GR UR STRIP: 1.02 (ref 1–1.03)
UROBILINOGEN UR STRIP-MCNC: NORMAL MG/DL

## 2024-12-05 PROCEDURE — 81003 URINALYSIS AUTO W/O SCOPE: CPT | Mod: ZL

## 2024-12-09 ENCOUNTER — TELEPHONE (OUTPATIENT)
Dept: ONCOLOGY | Facility: OTHER | Age: 84
End: 2024-12-09
Payer: COMMERCIAL

## 2024-12-09 NOTE — ORAL ONC MGMT
"Oral Chemotherapy Monitoring Program    Primary Oncologist: Dr. Herrera  Drug: enzalutamide  Start Date: 12/1/24    Subjective/Objective:  Po Blanton is a 84 year old male contacted by phone for a follow-up visit for oral chemotherapy. Colten confirms that he is taking 2 tablets of Xtandi daily and reports no side effects at this time.        11/19/2024     3:00 PM 11/26/2024     1:00 PM 12/9/2024     8:00 AM   ORAL CHEMOTHERAPY   Assessment Type Initial Work up New Teach Initial Follow up   Diagnosis Code Prostate Cancer Prostate Cancer Prostate Cancer   Providers Sharon Herrera   Clinic Name/Location [facility]   Drug Name Xtandi (enzalutamide) Xtandi (enzalutamide) Xtandi (enzalutamide)   Dose 160 mg 160 mg 160 mg   Current Schedule Daily Daily Daily   Cycle Details Continuous Continuous Continuous   Start Date of Last Cycle   12/1/2024   Planned next cycle start date   12/31/2024   Doses missed in last 2 weeks   0   Adherence Assessment   Adherent   Adverse Effects   No AE identified during assessment   Any new drug interactions? Yes     Pharmacist Intervention? Yes     Intervention(s) --     Is the dose as ordered appropriate for the patient? Yes         Vitals:  BP:   BP Readings from Last 1 Encounters:   12/05/24 (!) 140/80     Wt Readings from Last 1 Encounters:   12/05/24 91.6 kg (202 lb)     Estimated body surface area is 2.17 meters squared as calculated from the following:    Height as of 12/5/24: 1.854 m (6' 1\").    Weight as of 12/5/24: 91.6 kg (202 lb).    Labs:  _  Result Component Current Result Ref Range   Sodium 137 (11/19/2024) 135 - 145 mmol/L     _  Result Component Current Result Ref Range   Potassium 4.8 (11/19/2024) 3.4 - 5.3 mmol/L     _  Result Component Current Result Ref Range   Calcium 9.1 (11/19/2024) 8.8 - 10.4 mg/dL     No results found for Mag within last 30 days.     No results found for Phos within last 30 days.     _  Result Component " Current Result Ref Range   Albumin 4.2 (11/19/2024) 3.5 - 5.2 g/dL     _  Result Component Current Result Ref Range   Urea Nitrogen 19.5 (11/19/2024) 8.0 - 23.0 mg/dL     _  Result Component Current Result Ref Range   Creatinine 1.27 (H) (11/19/2024) 0.67 - 1.17 mg/dL       _  Result Component Current Result Ref Range   AST 25 (11/19/2024) 0 - 45 U/L     _  Result Component Current Result Ref Range   ALT 21 (11/19/2024) 0 - 70 U/L     _  Result Component Current Result Ref Range   Bilirubin Total 0.8 (11/19/2024) <=1.2 mg/dL       _  Result Component Current Result Ref Range   WBC Count 5.1 (11/19/2024) 4.0 - 11.0 10e3/uL     _  Result Component Current Result Ref Range   Hemoglobin 13.6 (11/19/2024) 13.3 - 17.7 g/dL     _  Result Component Current Result Ref Range   Platelet Count 202 (11/19/2024) 150 - 450 10e3/uL     No results found for ANC within last 30 days.         Assessment/Plan:  Colten is tolerating the initiation of therapy well. Continue with current treatment plan.    Follow-Up:  1/7 - labs  1/14 - provider appt      Paul Pandey, PharmD  Oral Chemotherapy Pharmacist  [phone]

## 2024-12-18 ENCOUNTER — APPOINTMENT (OUTPATIENT)
Dept: LAB | Facility: OTHER | Age: 84
End: 2024-12-18
Attending: INTERNAL MEDICINE
Payer: MEDICARE

## 2024-12-18 ENCOUNTER — HOSPITAL ENCOUNTER (OUTPATIENT)
Dept: INFUSION THERAPY | Facility: OTHER | Age: 84
Discharge: HOME | End: 2024-12-18
Payer: MEDICARE

## 2024-12-18 VITALS
TEMPERATURE: 96.1 F | DIASTOLIC BLOOD PRESSURE: 55 MMHG | OXYGEN SATURATION: 98 % | SYSTOLIC BLOOD PRESSURE: 146 MMHG | HEART RATE: 53 BPM | RESPIRATION RATE: 14 BRPM

## 2024-12-18 DIAGNOSIS — Z19.1 METASTATIC CASTRATION-SENSITIVE ADENOCARCINOMA OF PROSTATE (H): Primary | ICD-10-CM

## 2024-12-18 DIAGNOSIS — C61 METASTATIC CASTRATION-SENSITIVE ADENOCARCINOMA OF PROSTATE (H): Primary | ICD-10-CM

## 2024-12-18 LAB
ALBUMIN SERPL BCG-MCNC: 3.8 G/DL (ref 3.5–5.2)
ALP SERPL-CCNC: 74 U/L (ref 40–150)
ALT SERPL W P-5'-P-CCNC: 18 U/L (ref 0–70)
ANION GAP SERPL CALCULATED.3IONS-SCNC: 6 MMOL/L (ref 7–15)
AST SERPL W P-5'-P-CCNC: 25 U/L (ref 0–45)
BASOPHILS # BLD AUTO: 0 10E3/UL (ref 0–0.2)
BASOPHILS NFR BLD AUTO: 1 %
BILIRUB SERPL-MCNC: 0.9 MG/DL
BUN SERPL-MCNC: 17.8 MG/DL (ref 8–23)
CALCIUM SERPL-MCNC: 9.2 MG/DL (ref 8.8–10.4)
CALCIUM SERPL-MCNC: 9.2 MG/DL (ref 8.8–10.4)
CHLORIDE SERPL-SCNC: 105 MMOL/L (ref 98–107)
CREAT SERPL-MCNC: 1.21 MG/DL (ref 0.67–1.17)
EGFRCR SERPLBLD CKD-EPI 2021: 60 ML/MIN/1.73M2
EOSINOPHIL # BLD AUTO: 0.3 10E3/UL (ref 0–0.7)
EOSINOPHIL NFR BLD AUTO: 7 %
ERYTHROCYTE [DISTWIDTH] IN BLOOD BY AUTOMATED COUNT: 13.2 % (ref 10–15)
GLUCOSE SERPL-MCNC: 122 MG/DL (ref 70–99)
HCO3 SERPL-SCNC: 28 MMOL/L (ref 22–29)
HCT VFR BLD AUTO: 39.8 % (ref 40–53)
HGB BLD-MCNC: 13.3 G/DL (ref 13.3–17.7)
IMM GRANULOCYTES # BLD: 0 10E3/UL
IMM GRANULOCYTES NFR BLD: 0 %
LDH SERPL L TO P-CCNC: 185 U/L (ref 0–250)
LYMPHOCYTES # BLD AUTO: 1.1 10E3/UL (ref 0.8–5.3)
LYMPHOCYTES NFR BLD AUTO: 25 %
MCH RBC QN AUTO: 29.5 PG (ref 26.5–33)
MCHC RBC AUTO-ENTMCNC: 33.4 G/DL (ref 31.5–36.5)
MCV RBC AUTO: 88 FL (ref 78–100)
MONOCYTES # BLD AUTO: 0.4 10E3/UL (ref 0–1.3)
MONOCYTES NFR BLD AUTO: 9 %
NEUTROPHILS # BLD AUTO: 2.6 10E3/UL (ref 1.6–8.3)
NEUTROPHILS NFR BLD AUTO: 59 %
NRBC # BLD AUTO: 0 10E3/UL
NRBC BLD AUTO-RTO: 0 /100
PHOSPHATE SERPL-MCNC: 3.6 MG/DL (ref 2.5–4.5)
PLATELET # BLD AUTO: 179 10E3/UL (ref 150–450)
POTASSIUM SERPL-SCNC: 4.3 MMOL/L (ref 3.4–5.3)
PROT SERPL-MCNC: 6.7 G/DL (ref 6.4–8.3)
PSA SERPL DL<=0.01 NG/ML-MCNC: 1.86 NG/ML
RBC # BLD AUTO: 4.51 10E6/UL (ref 4.4–5.9)
SODIUM SERPL-SCNC: 139 MMOL/L (ref 135–145)
WBC # BLD AUTO: 4.5 10E3/UL (ref 4–11)

## 2024-12-18 PROCEDURE — 82247 BILIRUBIN TOTAL: CPT | Performed by: INTERNAL MEDICINE

## 2024-12-18 PROCEDURE — 84100 ASSAY OF PHOSPHORUS: CPT | Performed by: INTERNAL MEDICINE

## 2024-12-18 PROCEDURE — 36415 COLL VENOUS BLD VENIPUNCTURE: CPT | Performed by: INTERNAL MEDICINE

## 2024-12-18 PROCEDURE — 96372 THER/PROPH/DIAG INJ SC/IM: CPT | Performed by: INTERNAL MEDICINE

## 2024-12-18 PROCEDURE — 82310 ASSAY OF CALCIUM: CPT | Performed by: INTERNAL MEDICINE

## 2024-12-18 PROCEDURE — 85025 COMPLETE CBC W/AUTO DIFF WBC: CPT | Performed by: INTERNAL MEDICINE

## 2024-12-18 PROCEDURE — 84460 ALANINE AMINO (ALT) (SGPT): CPT | Performed by: INTERNAL MEDICINE

## 2024-12-18 PROCEDURE — 250N000011 HC RX IP 250 OP 636: Mod: JZ | Performed by: INTERNAL MEDICINE

## 2024-12-18 PROCEDURE — 83615 LACTATE (LD) (LDH) ENZYME: CPT | Performed by: INTERNAL MEDICINE

## 2024-12-18 PROCEDURE — 84153 ASSAY OF PSA TOTAL: CPT | Performed by: INTERNAL MEDICINE

## 2024-12-18 RX ORDER — MEPERIDINE HYDROCHLORIDE 25 MG/ML
25 INJECTION INTRAMUSCULAR; INTRAVENOUS; SUBCUTANEOUS
OUTPATIENT
Start: 2025-01-17

## 2024-12-18 RX ORDER — METHYLPREDNISOLONE SODIUM SUCCINATE 40 MG/ML
40 INJECTION INTRAMUSCULAR; INTRAVENOUS
Start: 2025-01-17

## 2024-12-18 RX ORDER — EPINEPHRINE 1 MG/ML
0.3 INJECTION, SOLUTION, CONCENTRATE INTRAVENOUS EVERY 5 MIN PRN
OUTPATIENT
Start: 2025-01-17

## 2024-12-18 RX ORDER — ALBUTEROL SULFATE 0.83 MG/ML
2.5 SOLUTION RESPIRATORY (INHALATION)
OUTPATIENT
Start: 2025-01-17

## 2024-12-18 RX ORDER — DIPHENHYDRAMINE HYDROCHLORIDE 50 MG/ML
25 INJECTION INTRAMUSCULAR; INTRAVENOUS
Start: 2025-01-17

## 2024-12-18 RX ORDER — DIPHENHYDRAMINE HYDROCHLORIDE 50 MG/ML
50 INJECTION INTRAMUSCULAR; INTRAVENOUS
Start: 2025-01-17

## 2024-12-18 RX ORDER — ALBUTEROL SULFATE 90 UG/1
1-2 INHALANT RESPIRATORY (INHALATION)
Start: 2025-01-17

## 2024-12-18 RX ADMIN — DENOSUMAB 120 MG: 120 INJECTION SUBCUTANEOUS at 09:40

## 2024-12-18 NOTE — PHARMACY-MEDICATION TEACHING
Pharmacy: NEW INFUSION MEDICATION EDUCATION    Po Blanton  [id number] [facility]  [place] [id number] [phone] (home)   84 year old male  PCP:German Lu    No Known Allergies      Summary of Education:   Met with patient today to review new medications to be administered in infusion including denosumab (Xgeva). Discussed cycle length, and home use of medications including Calcium supplementation (pt confirms he already takes a calcium supplement). Patient asked no questions and all concerns were addressed.    Materials Provided:   Drug information handouts printed from Clinical Pharmacology on: denosumab (Xgeva).    Thank you for the consult.     Riki White [facility] ....................  12/18/2024   9:59 AM

## 2024-12-18 NOTE — NURSING NOTE
Infusion Nursing Note:  Po Blanton presents today for Xgeva.    Patient seen by provider today: No   present during visit today: Not Applicable.    Note: N/A.      Intravenous Access:  Labs drawn without difficulty.    Treatment Conditions:  Lab Results   Component Value Date    HGB 13.3 12/18/2024    WBC 4.5 12/18/2024    ANEUTAUTO 2.6 12/18/2024     12/18/2024        Lab Results   Component Value Date     12/18/2024    POTASSIUM 4.3 12/18/2024    MAG 2.0 11/07/2023    CR 1.21 (H) 12/18/2024    VIRGIL 9.2 12/18/2024    VIRGIL 9.2 12/18/2024    BILITOTAL 0.9 12/18/2024    ALBUMIN 3.8 12/18/2024    ALT 18 12/18/2024    AST 25 12/18/2024       Results reviewed, labs MET treatment parameters, ok to proceed with treatment.      Post Infusion Assessment:  Patient tolerated injection without incident.  Blood return noted pre and post infusion.  Site patent and intact, free from redness, edema or discomfort.  No evidence of extravasations.  Access discontinued per protocol.     The following medication was given:     MEDICATION: Xgeva  ROUTE: SQ  SITE: Arm - Left  DOSE: 120mg  LOT #: [id number]  :  [employer]  EXPIRATION DATE:  03/31/2027    Discharge Plan:   Discharge instructions reviewed with: Patient.  Patient and/or family verbalized understanding of discharge instructions and all questions answered.  Copy of AVS reviewed with patient and/or family.  Patient will return 1/7/25 for next appointment.  Patient discharged in stable condition accompanied by: self.  Departure Mode: Ambulatory.      Margaret Haney RN

## 2024-12-23 ENCOUNTER — TELEPHONE (OUTPATIENT)
Dept: ONCOLOGY | Facility: CLINIC | Age: 84
End: 2024-12-23
Payer: COMMERCIAL

## 2024-12-23 DIAGNOSIS — C61 METASTATIC CASTRATION-SENSITIVE ADENOCARCINOMA OF PROSTATE (H): Primary | ICD-10-CM

## 2024-12-23 DIAGNOSIS — Z19.1 METASTATIC CASTRATION-SENSITIVE ADENOCARCINOMA OF PROSTATE (H): Primary | ICD-10-CM

## 2024-12-23 NOTE — TELEPHONE ENCOUNTER
BHAVYA APPROVED    Medication: XTANDI 80 MG PO TABS  Amount: $ 8,000  Foundation Name: [facility] Phone: [phone]  [employer] Fax:    Member ID: [id number]   BIN: [id number]  PCN: [id number]  Group: [id number]   Foundation Effective Date: 11/23/2024  Foundation Expiration Date: 11/22/2025  Additional Information:    Patient Notified:

## 2024-12-24 LAB — TESTOST SERPL-MCNC: 25 NG/DL (ref 240–950)

## 2025-01-14 ENCOUNTER — LAB (OUTPATIENT)
Dept: LAB | Facility: OTHER | Age: 85
End: 2025-01-14
Attending: INTERNAL MEDICINE
Payer: COMMERCIAL

## 2025-01-14 ENCOUNTER — ONCOLOGY VISIT (OUTPATIENT)
Dept: ONCOLOGY | Facility: OTHER | Age: 85
End: 2025-01-14
Attending: INTERNAL MEDICINE
Payer: MEDICARE

## 2025-01-14 VITALS
BODY MASS INDEX: 26.91 KG/M2 | OXYGEN SATURATION: 100 % | HEART RATE: 67 BPM | WEIGHT: 204 LBS | RESPIRATION RATE: 16 BRPM | TEMPERATURE: 96.6 F

## 2025-01-14 DIAGNOSIS — Z19.1 METASTATIC CASTRATION-SENSITIVE ADENOCARCINOMA OF PROSTATE (H): Primary | ICD-10-CM

## 2025-01-14 DIAGNOSIS — C61 METASTATIC CASTRATION-SENSITIVE ADENOCARCINOMA OF PROSTATE (H): Primary | ICD-10-CM

## 2025-01-14 DIAGNOSIS — Z19.1 METASTATIC CASTRATION-SENSITIVE ADENOCARCINOMA OF PROSTATE (H): ICD-10-CM

## 2025-01-14 DIAGNOSIS — C61 METASTATIC CASTRATION-SENSITIVE ADENOCARCINOMA OF PROSTATE (H): ICD-10-CM

## 2025-01-14 LAB
ALBUMIN SERPL BCG-MCNC: 4 G/DL (ref 3.5–5.2)
ALP SERPL-CCNC: 73 U/L (ref 40–150)
ALT SERPL W P-5'-P-CCNC: 33 U/L (ref 0–70)
ANION GAP SERPL CALCULATED.3IONS-SCNC: 9 MMOL/L (ref 7–15)
AST SERPL W P-5'-P-CCNC: 37 U/L (ref 0–45)
BASOPHILS # BLD AUTO: 0 10E3/UL (ref 0–0.2)
BASOPHILS NFR BLD AUTO: 1 %
BILIRUB SERPL-MCNC: 0.8 MG/DL
BUN SERPL-MCNC: 24.1 MG/DL (ref 8–23)
CALCIUM SERPL-MCNC: 9 MG/DL (ref 8.8–10.4)
CHLORIDE SERPL-SCNC: 106 MMOL/L (ref 98–107)
CREAT SERPL-MCNC: 1.26 MG/DL (ref 0.67–1.17)
EGFRCR SERPLBLD CKD-EPI 2021: 57 ML/MIN/1.73M2
EOSINOPHIL # BLD AUTO: 0.2 10E3/UL (ref 0–0.7)
EOSINOPHIL NFR BLD AUTO: 6 %
ERYTHROCYTE [DISTWIDTH] IN BLOOD BY AUTOMATED COUNT: 13.2 % (ref 10–15)
GLUCOSE SERPL-MCNC: 132 MG/DL (ref 70–99)
HCO3 SERPL-SCNC: 25 MMOL/L (ref 22–29)
HCT VFR BLD AUTO: 39.5 % (ref 40–53)
HGB BLD-MCNC: 13.2 G/DL (ref 13.3–17.7)
IMM GRANULOCYTES # BLD: 0 10E3/UL
IMM GRANULOCYTES NFR BLD: 0 %
LDH SERPL L TO P-CCNC: 193 U/L (ref 0–250)
LYMPHOCYTES # BLD AUTO: 1 10E3/UL (ref 0.8–5.3)
LYMPHOCYTES NFR BLD AUTO: 26 %
MCH RBC QN AUTO: 29.6 PG (ref 26.5–33)
MCHC RBC AUTO-ENTMCNC: 33.4 G/DL (ref 31.5–36.5)
MCV RBC AUTO: 89 FL (ref 78–100)
MONOCYTES # BLD AUTO: 0.4 10E3/UL (ref 0–1.3)
MONOCYTES NFR BLD AUTO: 10 %
NEUTROPHILS # BLD AUTO: 2.1 10E3/UL (ref 1.6–8.3)
NEUTROPHILS NFR BLD AUTO: 57 %
NRBC # BLD AUTO: 0 10E3/UL
NRBC BLD AUTO-RTO: 0 /100
PLATELET # BLD AUTO: 181 10E3/UL (ref 150–450)
POTASSIUM SERPL-SCNC: 4.8 MMOL/L (ref 3.4–5.3)
PROT SERPL-MCNC: 6.7 G/DL (ref 6.4–8.3)
PSA SERPL DL<=0.01 NG/ML-MCNC: 0.84 NG/ML
RBC # BLD AUTO: 4.46 10E6/UL (ref 4.4–5.9)
SODIUM SERPL-SCNC: 140 MMOL/L (ref 135–145)
WBC # BLD AUTO: 3.7 10E3/UL (ref 4–11)

## 2025-01-14 PROCEDURE — 36415 COLL VENOUS BLD VENIPUNCTURE: CPT | Mod: ZL

## 2025-01-14 PROCEDURE — 83615 LACTATE (LD) (LDH) ENZYME: CPT | Mod: ZL

## 2025-01-14 PROCEDURE — 84153 ASSAY OF PSA TOTAL: CPT | Mod: ZL

## 2025-01-14 PROCEDURE — 84100 ASSAY OF PHOSPHORUS: CPT | Performed by: INTERNAL MEDICINE

## 2025-01-14 PROCEDURE — 85004 AUTOMATED DIFF WBC COUNT: CPT | Mod: ZL

## 2025-01-14 PROCEDURE — 84450 TRANSFERASE (AST) (SGOT): CPT | Mod: ZL

## 2025-01-14 PROCEDURE — 84460 ALANINE AMINO (ALT) (SGPT): CPT | Mod: ZL

## 2025-01-14 PROCEDURE — 84403 ASSAY OF TOTAL TESTOSTERONE: CPT | Mod: ZL

## 2025-01-14 PROCEDURE — G0463 HOSPITAL OUTPT CLINIC VISIT: HCPCS | Performed by: INTERNAL MEDICINE

## 2025-01-14 PROCEDURE — 85048 AUTOMATED LEUKOCYTE COUNT: CPT | Mod: ZL

## 2025-01-14 PROCEDURE — 84520 ASSAY OF UREA NITROGEN: CPT | Mod: ZL

## 2025-01-14 RX ORDER — TRIAMCINOLONE ACETONIDE 1 MG/G
CREAM TOPICAL 2 TIMES DAILY PRN
Qty: 45 G | Refills: 1 | Status: SHIPPED | OUTPATIENT
Start: 2025-01-14

## 2025-01-14 ASSESSMENT — PAIN SCALES - GENERAL: PAINLEVEL_OUTOF10: NO PAIN (0)

## 2025-01-14 NOTE — NURSING NOTE
"Oncology Rooming Note    January 14, 2025 1:34 PM   Po Blanton is a 84 year old male who presents for:    Chief Complaint   Patient presents with    Oncology Clinic Visit     Prostate CA     Initial Vitals: Pulse 67   Temp (!) 96.6  F (35.9  C) (Tympanic)   Resp 16   Wt 92.5 kg (204 lb)   SpO2 100%   BMI 26.91 kg/m   Estimated body mass index is 26.91 kg/m  as calculated from the following:    Height as of 12/5/24: 1.854 m (6' 1\").    Weight as of this encounter: 92.5 kg (204 lb). Body surface area is 2.18 meters squared.  No Pain (0) Comment: Data Unavailable   No LMP for male patient.  Allergies reviewed: Yes  Medications reviewed: Yes    Medications: Medication refills not needed today.  Pharmacy name entered into Vupen:    "[id number] - [place], [place] - [address]  [facility] - [place], [place] - [address]    Frailty Screening:   Is the patient here for a new oncology consult visit in cancer care? 2. No      Clinical concerns: Hot flashes.    Itching on the back of his right shoulder.    Zuleyma Gagnon CMA (AAMA) 1/14/2025 1:34 PM  "

## 2025-01-14 NOTE — PROGRESS NOTES
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  Elected to receive external beam radiotherapy and received 7700 cGy to the prostate and seminal vesicles completed on April 15, 2013.  Radiation was given at [facility].  According the patient his PSA dropped to 0 subsequently.  He is followed by Dr. Moore who performed cystoscopy for gross hematuria in January 2023.  The findings with prior TURP defect was identified with some moderate prostate regrowth otherwise the bladder appeared to be normal capacity there were no tumors stones or foreign bodies.  His hematuria eventually resolved.  His PSA at that time was 2.35.Dr. Moore left the practice.  The patient was seen by his primary care physician on September 5, 2024 who ordered a PSA and it was elevated at 9.36 He was referred to urology and was seen by Dr. Abraham Reina on October 1, 2024 and he recommended PET PSMA Pylarify scan.  Given his history of gross hematuria he also recommended urine for cytology.  This was obtained and came  back negative PET scan was performed on  and the findings were that there was intensely increased uptake within sclerotic lesion in the posterior aspect of the left eighth rib compatible with osseous metastatic disease.  The prostate was enlarged with asymmetric intensely increased uptake right more than left.  Dr. Reina felt that this was all consistent with recurrent metastatic castrate sensitive prostate cancer.  He felt a biopsy was not necessary to confirm this.  Patient is now here for treatment options.  He is overall asymptomatic denies any rib cage pain or back pain.  He has a history of atrial fibrillation and is currently on Eliquis.  He has not seen a cardiologist and plans to see 1 next week.  He states that he was diagnosed atrial fibrillation after an upper respiratory infection and was placed on Eliquis but has not follow-up with cardiology in at least 10 years.  Denies chest pain or palpitations.  Denies urinary frequency or gross hematuria.  He says he gets occasional urgency.  Denies any bone pain.  Denies shortness of  breathe was in the  and was in the [employer] between  and 65 and was stationed in [facility]. There is from family history of cancer mother  of breast cancer age 49 he had a brother with prostate cancer. 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 daily, Lupron 22.5 mg IM every 3 months as well as Xgeva 120 mg subcutaneously monthly.  He recently had developed gross hematuria and was evaluated by Dr. 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  He was told to stop Eliquis but according the patient he is decreased the dose to 5 mg p.o. daily and still taking it he states he has had no further hematuria.  He  has been evaluated by cardiology who confirmed that he is still in A-fib to determine continued treatment with Eliquis.  Otherwise he has no other complaints of urinary frequency or flank pain.  He denies fevers, night sweats or weight loss.  Denies any rib cage pain.  Denies dyspnea, cough or hemoptysis.  Denies any abdominal pain or change in bowel habits.  Denies bright red blood per rectum hematemesis or melena.      ______________________________________________________________________________    Past History    Past Medical History:   Diagnosis Date    Benign neoplasm of colon     30 centimeters that returned as tubular adenoma with low-grade dysplasia.    Esophageal reflux     No Comments Provided    Hypertrophy of prostate without urinary obstruction and other lower urinary tract symptoms (LUTS)     PSA 3.04 10/00;  3.4 03/05; 5.5 04/08.       Past Surgical History:   Procedure Laterality Date    BIOPSY      biopsy of prostate- needle/punch    COLONOSCOPY  05/07/2018    Dr. Humphreys, [facility]    GENITOURINARY SURGERY  01/06/2012    TURP    GENITOURINARY SURGERY  2008    cystoscopy    OTHER SURGICAL HISTORY      [id number],([place]) FACILITY COLORECTAL CA SCREEN FLEX SCOPE           Review of systems.  CNS: There are no headaches, no blurred vision, no change in mental status,   ENT: There is no hearing loss.  Respiratory: There is no cough, hemoptysis or shortness of breath  Cardiac: There is no chest pain, orthopnea, PND, or ankle edema.  GI: There is no bright red blood per rectum, no hematemesis, no reflux, no diarrhea or constipation  Musculoskeletal: There is no specific areas of bone pain  : There is no urinary frequency, hematuria.  Constitutional: There is no fevers, night sweats, weight loss.  Endocrine: Mild to moderate fatigue  Neuro: There is no tingling or numbness in the hands or feet.  Hematologic: There is no gingival bleeding, epistaxis, or easy bruisability.  Dermatologic: There  is no skin rash.  A 14 point review of systems is otherwise negative.          Physical Exam    Pulse 67   Temp (!) 96.6  F (35.9  C) (Tympanic)   Resp 16   Wt 92.5 kg (204 lb)   SpO2 100%   BMI 26.91 kg/m        GENERAL: Alert and oriented to time place and person. Seated comfortably. In no distress.    HEAD: Atraumatic and normocephalic.    EYES: JAYLA, EOMI.  No pallor.  No icterus.    Oral cavity: no mucosal lesion or tonsillar enlargement.    NECK: supple. JVP normal.  No thyroid enlargement.    LYMPH NODES: There are no palpable cervical, supraclavicular, axillary, or inguinal nodes.    LUNGS: clear to auscultation bilaterally.  Resonant to percussion throughout bilaterally.  Symmetrical breath movements bilaterally.    HEART: S1 and S2 are heard. Regular rate and rhythm.  No murmur or gallop or rub heard.  No peripheral edema.    ABDOMEN: Soft. Not tender. Not distended.  No palpable hepatomegaly or splenomegaly.  No other mass palpable.  Bowel sounds heard.    EXTREMITIES: There is no ankle edema.  SKIN: no rash, or bruising or purpura.    NEURO: Grossly non-focal.    Lab Results    Recent Results (from the past 240 hours)   Comprehensive metabolic panel    Collection Time: 01/14/25  1:16 PM   Result Value Ref Range    Sodium 140 135 - 145 mmol/L    Potassium 4.8 3.4 - 5.3 mmol/L    Carbon Dioxide (CO2) 25 22 - 29 mmol/L    Anion Gap 9 7 - 15 mmol/L    Urea Nitrogen 24.1 (H) 8.0 - 23.0 mg/dL    Creatinine 1.26 (H) 0.67 - 1.17 mg/dL    GFR Estimate 57 (L) >60 mL/min/1.73m2    Calcium 9.0 8.8 - 10.4 mg/dL    Chloride 106 98 - 107 mmol/L    Glucose 132 (H) 70 - 99 mg/dL    Alkaline Phosphatase 73 40 - 150 U/L    AST 37 0 - 45 U/L    ALT 33 0 - 70 U/L    Protein Total 6.7 6.4 - 8.3 g/dL    Albumin 4.0 3.5 - 5.2 g/dL    Bilirubin Total 0.8 <=1.2 mg/dL   Lactate Dehydrogenase    Collection Time: 01/14/25  1:16 PM   Result Value Ref Range    Lactate Dehydrogenase 193 0 - 250 U/L   PSA tumor marker     Collection Time: 01/14/25  1:16 PM   Result Value Ref Range    PSA Tumor Marker 0.84 ng/mL   CBC with platelets and differential    Collection Time: 01/14/25  1:16 PM   Result Value Ref Range    WBC Count 3.7 (L) 4.0 - 11.0 10e3/uL    RBC Count 4.46 4.40 - 5.90 10e6/uL    Hemoglobin 13.2 (L) 13.3 - 17.7 g/dL    Hematocrit 39.5 (L) 40.0 - 53.0 %    MCV 89 78 - 100 fL    MCH 29.6 26.5 - 33.0 pg    MCHC 33.4 31.5 - 36.5 g/dL    RDW 13.2 10.0 - 15.0 %    Platelet Count 181 150 - 450 10e3/uL    % Neutrophils 57 %    % Lymphocytes 26 %    % Monocytes 10 %    % Eosinophils 6 %    % Basophils 1 %    % Immature Granulocytes 0 %    NRBCs per 100 WBC 0 <1 /100    Absolute Neutrophils 2.1 1.6 - 8.3 10e3/uL    Absolute Lymphocytes 1.0 0.8 - 5.3 10e3/uL    Absolute Monocytes 0.4 0.0 - 1.3 10e3/uL    Absolute Eosinophils 0.2 0.0 - 0.7 10e3/uL    Absolute Basophils 0.0 0.0 - 0.2 10e3/uL    Absolute Immature Granulocytes 0.0 <=0.4 10e3/uL    Absolute NRBCs 0.0 10e3/uL   Phosphorus    Collection Time: 01/14/25  1:16 PM   Result Value Ref Range    Phosphorus 3.2 2.5 - 4.5 mg/dL       Imaging    No results found.    Assessment and Plan: #1 :.  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  Patient was deemed a candidate for enzalutamide/Lupron/Xgeva he has received 1 cycle of enzalutamide and his PSA is dropped significantly from 14.95 down to 0.84.  Plan is to continue Lupron/enzalutamide Xgeva we will see the patient in 3 months obtain CBC CMP LDH testosterone level as well as PSA tumor marker.  Time spent: 16 minutes was spent on this patient visit : spent time reviewing multiple physician provider notes including urology notes, cardiology  notes, we reviewed lab results, discussed lab results the patient, performed history/physical, documented history/physical, and ordered continue treatment with enzalutamide/Lupron 22.5 mg IM every 3 months as well as Xgeva 120 mg subcu monthly as well as follow-up labs and appointments.  The longitudinal plan of care for the diagnosis(es)/condition(s) as documented were addressed during this visit. Due to the added complexity in care, I will continue to support Colten in the subsequent management and with ongoing continuity of care.    Cancer Staging   No matching staging information was found for the patient.        Signed by: Dewey Herrera MD    CC: German Lu MD

## 2025-01-14 NOTE — PATIENT INSTRUCTIONS
Use Triamcinolone cream on the itchy spot on your back.    Follow up with Dr Herrera in three months after lab/Xgeva.

## 2025-01-16 LAB — TESTOST SERPL-MCNC: 12 NG/DL (ref 240–950)

## 2025-01-22 DIAGNOSIS — Z19.1 METASTATIC CASTRATION-SENSITIVE ADENOCARCINOMA OF PROSTATE (H): Primary | ICD-10-CM

## 2025-01-22 DIAGNOSIS — C61 METASTATIC CASTRATION-SENSITIVE ADENOCARCINOMA OF PROSTATE (H): Primary | ICD-10-CM

## 2025-02-13 NOTE — PROGRESS NOTES
"Per last office visit  11/21/19 with Oscar Moore MD \"Plan Annual PSA \"          Orders Placed      " Yes

## 2025-02-18 ENCOUNTER — HOSPITAL ENCOUNTER (OUTPATIENT)
Dept: INFUSION THERAPY | Facility: OTHER | Age: 85
Discharge: HOME | End: 2025-02-18
Attending: INTERNAL MEDICINE
Payer: MEDICARE

## 2025-02-18 ENCOUNTER — DOCUMENTATION ONLY (OUTPATIENT)
Dept: ONCOLOGY | Facility: OTHER | Age: 85
End: 2025-02-18

## 2025-02-18 ENCOUNTER — LAB (OUTPATIENT)
Dept: LAB | Facility: OTHER | Age: 85
End: 2025-02-18
Attending: INTERNAL MEDICINE
Payer: MEDICARE

## 2025-02-18 ENCOUNTER — ALLIED HEALTH/NURSE VISIT (OUTPATIENT)
Dept: UROLOGY | Facility: OTHER | Age: 85
End: 2025-02-18
Attending: UROLOGY
Payer: MEDICARE

## 2025-02-18 VITALS
DIASTOLIC BLOOD PRESSURE: 52 MMHG | TEMPERATURE: 97.2 F | HEART RATE: 71 BPM | SYSTOLIC BLOOD PRESSURE: 143 MMHG | RESPIRATION RATE: 16 BRPM

## 2025-02-18 DIAGNOSIS — C61 METASTATIC CASTRATION-SENSITIVE ADENOCARCINOMA OF PROSTATE (H): ICD-10-CM

## 2025-02-18 DIAGNOSIS — Z19.1 METASTATIC CASTRATION-SENSITIVE ADENOCARCINOMA OF PROSTATE (H): Primary | ICD-10-CM

## 2025-02-18 DIAGNOSIS — C61 METASTATIC CASTRATION-SENSITIVE ADENOCARCINOMA OF PROSTATE (H): Primary | ICD-10-CM

## 2025-02-18 DIAGNOSIS — Z19.1 METASTATIC CASTRATION-SENSITIVE ADENOCARCINOMA OF PROSTATE (H): ICD-10-CM

## 2025-02-18 LAB
ALBUMIN SERPL BCG-MCNC: 4 G/DL (ref 3.5–5.2)
ALP SERPL-CCNC: 65 U/L (ref 40–150)
ALT SERPL W P-5'-P-CCNC: 16 U/L (ref 0–70)
ANION GAP SERPL CALCULATED.3IONS-SCNC: 12 MMOL/L (ref 7–15)
AST SERPL W P-5'-P-CCNC: 25 U/L (ref 0–45)
BASOPHILS # BLD AUTO: 0 10E3/UL (ref 0–0.2)
BASOPHILS NFR BLD AUTO: 1 %
BILIRUB SERPL-MCNC: 0.9 MG/DL
BUN SERPL-MCNC: 27.8 MG/DL (ref 8–23)
CALCIUM SERPL-MCNC: 9.4 MG/DL (ref 8.8–10.4)
CALCIUM SERPL-MCNC: 9.4 MG/DL (ref 8.8–10.4)
CHLORIDE SERPL-SCNC: 103 MMOL/L (ref 98–107)
CREAT SERPL-MCNC: 1.32 MG/DL (ref 0.67–1.17)
EGFRCR SERPLBLD CKD-EPI 2021: 54 ML/MIN/1.73M2
EOSINOPHIL # BLD AUTO: 0.2 10E3/UL (ref 0–0.7)
EOSINOPHIL NFR BLD AUTO: 4 %
ERYTHROCYTE [DISTWIDTH] IN BLOOD BY AUTOMATED COUNT: 13.2 % (ref 10–15)
GLUCOSE SERPL-MCNC: 97 MG/DL (ref 70–99)
HCO3 SERPL-SCNC: 25 MMOL/L (ref 22–29)
HCT VFR BLD AUTO: 41 % (ref 40–53)
HGB BLD-MCNC: 13.2 G/DL (ref 13.3–17.7)
IMM GRANULOCYTES # BLD: 0 10E3/UL
IMM GRANULOCYTES NFR BLD: 0 %
LDH SERPL L TO P-CCNC: 209 U/L (ref 0–250)
LYMPHOCYTES # BLD AUTO: 1.1 10E3/UL (ref 0.8–5.3)
LYMPHOCYTES NFR BLD AUTO: 26 %
MCH RBC QN AUTO: 29.6 PG (ref 26.5–33)
MCHC RBC AUTO-ENTMCNC: 32.2 G/DL (ref 31.5–36.5)
MCV RBC AUTO: 92 FL (ref 78–100)
MONOCYTES # BLD AUTO: 0.4 10E3/UL (ref 0–1.3)
MONOCYTES NFR BLD AUTO: 10 %
NEUTROPHILS # BLD AUTO: 2.6 10E3/UL (ref 1.6–8.3)
NEUTROPHILS NFR BLD AUTO: 59 %
NRBC # BLD AUTO: 0 10E3/UL
NRBC BLD AUTO-RTO: 0 /100
PHOSPHATE SERPL-MCNC: 3.7 MG/DL (ref 2.5–4.5)
PLATELET # BLD AUTO: 216 10E3/UL (ref 150–450)
POTASSIUM SERPL-SCNC: 5 MMOL/L (ref 3.4–5.3)
PROT SERPL-MCNC: 6.9 G/DL (ref 6.4–8.3)
PSA SERPL DL<=0.01 NG/ML-MCNC: 0.68 NG/ML
RBC # BLD AUTO: 4.46 10E6/UL (ref 4.4–5.9)
SODIUM SERPL-SCNC: 140 MMOL/L (ref 135–145)
WBC # BLD AUTO: 4.3 10E3/UL (ref 4–11)

## 2025-02-18 PROCEDURE — 85004 AUTOMATED DIFF WBC COUNT: CPT | Mod: ZL

## 2025-02-18 PROCEDURE — 83615 LACTATE (LD) (LDH) ENZYME: CPT | Mod: ZL

## 2025-02-18 PROCEDURE — 96402 CHEMO HORMON ANTINEOPL SQ/IM: CPT

## 2025-02-18 PROCEDURE — 82310 ASSAY OF CALCIUM: CPT | Performed by: INTERNAL MEDICINE

## 2025-02-18 PROCEDURE — 80053 COMPREHEN METABOLIC PANEL: CPT | Mod: ZL

## 2025-02-18 PROCEDURE — 36415 COLL VENOUS BLD VENIPUNCTURE: CPT | Performed by: INTERNAL MEDICINE

## 2025-02-18 PROCEDURE — 84403 ASSAY OF TOTAL TESTOSTERONE: CPT | Mod: ZL

## 2025-02-18 PROCEDURE — 250N000011 HC RX IP 250 OP 636: Mod: JZ | Performed by: INTERNAL MEDICINE

## 2025-02-18 PROCEDURE — 84153 ASSAY OF PSA TOTAL: CPT | Mod: ZL

## 2025-02-18 PROCEDURE — 84100 ASSAY OF PHOSPHORUS: CPT | Performed by: INTERNAL MEDICINE

## 2025-02-18 PROCEDURE — 96372 THER/PROPH/DIAG INJ SC/IM: CPT | Mod: XU | Performed by: INTERNAL MEDICINE

## 2025-02-18 PROCEDURE — 85018 HEMOGLOBIN: CPT | Mod: ZL

## 2025-02-18 RX ORDER — MEPERIDINE HYDROCHLORIDE 25 MG/ML
25 INJECTION INTRAMUSCULAR; INTRAVENOUS; SUBCUTANEOUS
OUTPATIENT
Start: 2025-03-18

## 2025-02-18 RX ORDER — ALBUTEROL SULFATE 90 UG/1
1-2 INHALANT RESPIRATORY (INHALATION)
Start: 2025-03-18

## 2025-02-18 RX ORDER — DIPHENHYDRAMINE HYDROCHLORIDE 50 MG/ML
50 INJECTION INTRAMUSCULAR; INTRAVENOUS
Start: 2025-03-18

## 2025-02-18 RX ORDER — EPINEPHRINE 1 MG/ML
0.3 INJECTION, SOLUTION, CONCENTRATE INTRAVENOUS EVERY 5 MIN PRN
OUTPATIENT
Start: 2025-03-18

## 2025-02-18 RX ORDER — METHYLPREDNISOLONE SODIUM SUCCINATE 40 MG/ML
40 INJECTION INTRAMUSCULAR; INTRAVENOUS
Start: 2025-03-18

## 2025-02-18 RX ORDER — DIPHENHYDRAMINE HYDROCHLORIDE 50 MG/ML
25 INJECTION INTRAMUSCULAR; INTRAVENOUS
Start: 2025-03-18

## 2025-02-18 RX ORDER — ALBUTEROL SULFATE 0.83 MG/ML
2.5 SOLUTION RESPIRATORY (INHALATION)
OUTPATIENT
Start: 2025-03-18

## 2025-02-18 RX ADMIN — LEUPROLIDE ACETATE 22.5 MG: KIT at 09:09

## 2025-02-18 RX ADMIN — DENOSUMAB 120 MG: 120 INJECTION SUBCUTANEOUS at 09:48

## 2025-02-18 NOTE — NURSING NOTE
Infusion Nursing Note:  Po COTTRELL Temoever presents today for xgeva.    Patient seen by provider today: No   present during visit today: Not Applicable.    Note: N/A.      Intravenous Access:  No Intravenous access/labs at this visit.    Treatment Conditions:  Lab Results   Component Value Date     02/18/2025    POTASSIUM 5.0 02/18/2025    MAG 2.0 11/07/2023    CR 1.32 (H) 02/18/2025    VIRGIL 9.4 02/18/2025    VIRGIL 9.4 02/18/2025    BILITOTAL 0.9 02/18/2025    ALBUMIN 4.0 02/18/2025    ALT 16 02/18/2025    AST 25 02/18/2025       Results reviewed, labs MET treatment parameters, ok to proceed with treatment.      Post Infusion Assessment:  Patient tolerated injection without incident.  Site patent and intact, free from redness, edema or discomfort.       Discharge Plan:   Patient and/or family verbalized understanding of discharge instructions and all questions answered.  Copy of AVS reviewed with patient and/or family.  Patient will return 3/19 for next appointment.  Patient discharged in stable condition accompanied by: self.  Departure Mode: Ambulatory.      Jean S. Hammann, RN

## 2025-02-18 NOTE — PROGRESS NOTES
Specialty Medication: Lupron 22.5 mg    Verified patient's first and last name, and . Patient stated reason for visit today is to receive a Lupron injection. Patient denied any concerns with previous injections. Lupron prepared then administered IM, as ordered. Administration of medication documented in MAR (see MAR for further information regarding dose, lot #, NDC #, expiration date). Patient encouraged to wait in lobby for 15 minutes post-injection and notify staff immediately of any reaction.     Rachel Theodore RN ....................  2025   8:53 AM

## 2025-02-20 DIAGNOSIS — Z19.1 METASTATIC CASTRATION-SENSITIVE ADENOCARCINOMA OF PROSTATE (H): Primary | ICD-10-CM

## 2025-02-20 DIAGNOSIS — C61 METASTATIC CASTRATION-SENSITIVE ADENOCARCINOMA OF PROSTATE (H): Primary | ICD-10-CM

## 2025-02-20 LAB — TESTOST SERPL-MCNC: 12 NG/DL (ref 240–950)

## 2025-03-19 ENCOUNTER — DOCUMENTATION ONLY (OUTPATIENT)
Dept: ONCOLOGY | Facility: OTHER | Age: 85
End: 2025-03-19

## 2025-03-19 ENCOUNTER — HOSPITAL ENCOUNTER (OUTPATIENT)
Dept: INFUSION THERAPY | Facility: OTHER | Age: 85
Discharge: HOME | End: 2025-03-19
Payer: MEDICARE

## 2025-03-19 ENCOUNTER — APPOINTMENT (OUTPATIENT)
Dept: LAB | Facility: OTHER | Age: 85
End: 2025-03-19
Payer: MEDICARE

## 2025-03-19 VITALS
RESPIRATION RATE: 16 BRPM | TEMPERATURE: 97.2 F | DIASTOLIC BLOOD PRESSURE: 65 MMHG | SYSTOLIC BLOOD PRESSURE: 158 MMHG | HEART RATE: 53 BPM

## 2025-03-19 DIAGNOSIS — C61 METASTATIC CASTRATION-SENSITIVE ADENOCARCINOMA OF PROSTATE (H): Primary | ICD-10-CM

## 2025-03-19 DIAGNOSIS — Z19.1 METASTATIC CASTRATION-SENSITIVE ADENOCARCINOMA OF PROSTATE (H): Primary | ICD-10-CM

## 2025-03-19 LAB
ALBUMIN SERPL BCG-MCNC: 3.8 G/DL (ref 3.5–5.2)
ALP SERPL-CCNC: 67 U/L (ref 40–150)
ALT SERPL W P-5'-P-CCNC: 15 U/L (ref 0–70)
ANION GAP SERPL CALCULATED.3IONS-SCNC: 8 MMOL/L (ref 7–15)
AST SERPL W P-5'-P-CCNC: 22 U/L (ref 0–45)
BASOPHILS # BLD AUTO: 0.1 10E3/UL (ref 0–0.2)
BASOPHILS NFR BLD AUTO: 1 %
BILIRUB SERPL-MCNC: 0.6 MG/DL
BUN SERPL-MCNC: 15.9 MG/DL (ref 8–23)
CALCIUM SERPL-MCNC: 9.1 MG/DL (ref 8.8–10.4)
CHLORIDE SERPL-SCNC: 103 MMOL/L (ref 98–107)
CREAT SERPL-MCNC: 1.11 MG/DL (ref 0.67–1.17)
EGFRCR SERPLBLD CKD-EPI 2021: 66 ML/MIN/1.73M2
EOSINOPHIL # BLD AUTO: 0.3 10E3/UL (ref 0–0.7)
EOSINOPHIL NFR BLD AUTO: 8 %
ERYTHROCYTE [DISTWIDTH] IN BLOOD BY AUTOMATED COUNT: 13 % (ref 10–15)
GLUCOSE SERPL-MCNC: 102 MG/DL (ref 70–99)
HCO3 SERPL-SCNC: 26 MMOL/L (ref 22–29)
HCT VFR BLD AUTO: 39.7 % (ref 40–53)
HGB BLD-MCNC: 13.4 G/DL (ref 13.3–17.7)
IMM GRANULOCYTES # BLD: 0 10E3/UL
IMM GRANULOCYTES NFR BLD: 0 %
LDH SERPL L TO P-CCNC: 181 U/L (ref 0–250)
LYMPHOCYTES # BLD AUTO: 1.1 10E3/UL (ref 0.8–5.3)
LYMPHOCYTES NFR BLD AUTO: 29 %
MCH RBC QN AUTO: 30.1 PG (ref 26.5–33)
MCHC RBC AUTO-ENTMCNC: 33.8 G/DL (ref 31.5–36.5)
MCV RBC AUTO: 89 FL (ref 78–100)
MONOCYTES # BLD AUTO: 0.5 10E3/UL (ref 0–1.3)
MONOCYTES NFR BLD AUTO: 13 %
NEUTROPHILS # BLD AUTO: 1.9 10E3/UL (ref 1.6–8.3)
NEUTROPHILS NFR BLD AUTO: 49 %
NRBC # BLD AUTO: 0 10E3/UL
NRBC BLD AUTO-RTO: 0 /100
PHOSPHATE SERPL-MCNC: 3.9 MG/DL (ref 2.5–4.5)
PLATELET # BLD AUTO: 223 10E3/UL (ref 150–450)
POTASSIUM SERPL-SCNC: 4.6 MMOL/L (ref 3.4–5.3)
PROT SERPL-MCNC: 6.8 G/DL (ref 6.4–8.3)
PSA SERPL DL<=0.01 NG/ML-MCNC: 0.36 NG/ML
RBC # BLD AUTO: 4.45 10E6/UL (ref 4.4–5.9)
SODIUM SERPL-SCNC: 137 MMOL/L (ref 135–145)
WBC # BLD AUTO: 3.8 10E3/UL (ref 4–11)

## 2025-03-19 PROCEDURE — 83615 LACTATE (LD) (LDH) ENZYME: CPT | Performed by: INTERNAL MEDICINE

## 2025-03-19 PROCEDURE — 84153 ASSAY OF PSA TOTAL: CPT | Performed by: INTERNAL MEDICINE

## 2025-03-19 PROCEDURE — 84100 ASSAY OF PHOSPHORUS: CPT | Performed by: INTERNAL MEDICINE

## 2025-03-19 PROCEDURE — 85018 HEMOGLOBIN: CPT | Performed by: INTERNAL MEDICINE

## 2025-03-19 PROCEDURE — 80053 COMPREHEN METABOLIC PANEL: CPT | Performed by: INTERNAL MEDICINE

## 2025-03-19 PROCEDURE — 36415 COLL VENOUS BLD VENIPUNCTURE: CPT | Performed by: INTERNAL MEDICINE

## 2025-03-19 PROCEDURE — 96372 THER/PROPH/DIAG INJ SC/IM: CPT | Performed by: INTERNAL MEDICINE

## 2025-03-19 PROCEDURE — 85004 AUTOMATED DIFF WBC COUNT: CPT | Performed by: INTERNAL MEDICINE

## 2025-03-19 PROCEDURE — 250N000011 HC RX IP 250 OP 636: Mod: JZ | Performed by: INTERNAL MEDICINE

## 2025-03-19 PROCEDURE — 85041 AUTOMATED RBC COUNT: CPT | Performed by: INTERNAL MEDICINE

## 2025-03-19 RX ORDER — ALBUTEROL SULFATE 0.83 MG/ML
2.5 SOLUTION RESPIRATORY (INHALATION)
OUTPATIENT
Start: 2025-03-20

## 2025-03-19 RX ORDER — MEPERIDINE HYDROCHLORIDE 25 MG/ML
25 INJECTION INTRAMUSCULAR; INTRAVENOUS; SUBCUTANEOUS
OUTPATIENT
Start: 2025-03-20

## 2025-03-19 RX ORDER — EPINEPHRINE 1 MG/ML
0.3 INJECTION, SOLUTION, CONCENTRATE INTRAVENOUS EVERY 5 MIN PRN
OUTPATIENT
Start: 2025-03-20

## 2025-03-19 RX ORDER — DIPHENHYDRAMINE HYDROCHLORIDE 50 MG/ML
50 INJECTION, SOLUTION INTRAMUSCULAR; INTRAVENOUS
Start: 2025-03-20

## 2025-03-19 RX ORDER — LISINOPRIL 40 MG/1
40 TABLET ORAL
COMMUNITY
Start: 2025-03-12

## 2025-03-19 RX ORDER — DIPHENHYDRAMINE HYDROCHLORIDE 50 MG/ML
25 INJECTION, SOLUTION INTRAMUSCULAR; INTRAVENOUS
Start: 2025-03-20

## 2025-03-19 RX ORDER — METHYLPREDNISOLONE SODIUM SUCCINATE 40 MG/ML
40 INJECTION INTRAMUSCULAR; INTRAVENOUS
Start: 2025-03-20

## 2025-03-19 RX ORDER — ALBUTEROL SULFATE 90 UG/1
1-2 INHALANT RESPIRATORY (INHALATION)
Start: 2025-03-20

## 2025-03-19 RX ADMIN — DENOSUMAB 120 MG: 120 INJECTION SUBCUTANEOUS at 08:56

## 2025-03-19 NOTE — NURSING NOTE
Infusion Nursing Note:  Po Blanton presents today for xgeva.    Patient seen by provider today: No   present during visit today: Not Applicable.    Note: N/A.      Intravenous Access:  No Intravenous access/labs at this visit.    Treatment Conditions:  Lab Results   Component Value Date     03/19/2025    POTASSIUM 4.6 03/19/2025    MAG 2.0 11/07/2023    CR 1.11 03/19/2025    VIRGIL 9.1 03/19/2025    BILITOTAL 0.6 03/19/2025    ALBUMIN 3.8 03/19/2025    ALT 15 03/19/2025    AST 22 03/19/2025       Results reviewed, labs MET treatment parameters, ok to proceed with treatment.      Post Infusion Assessment:  Patient tolerated injection without incident.  Site patent and intact, free from redness, edema or discomfort.       Discharge Plan:   Patient and/or family verbalized understanding of discharge instructions and all questions answered.  Copy of AVS reviewed with patient and/or family.  Patient will return 4/18 for next appointment.  Patient discharged in stable condition accompanied by: self.  Departure Mode: Ambulatory.      Jean S. Hammann, RN

## 2025-03-20 DIAGNOSIS — Z19.1 METASTATIC CASTRATION-SENSITIVE ADENOCARCINOMA OF PROSTATE (H): Primary | ICD-10-CM

## 2025-03-20 DIAGNOSIS — C61 METASTATIC CASTRATION-SENSITIVE ADENOCARCINOMA OF PROSTATE (H): Primary | ICD-10-CM

## 2025-03-24 DIAGNOSIS — Z19.1 METASTATIC CASTRATION-SENSITIVE ADENOCARCINOMA OF PROSTATE (H): Primary | ICD-10-CM

## 2025-03-24 DIAGNOSIS — C61 METASTATIC CASTRATION-SENSITIVE ADENOCARCINOMA OF PROSTATE (H): Primary | ICD-10-CM

## 2025-04-16 DIAGNOSIS — C61 METASTATIC CASTRATION-SENSITIVE ADENOCARCINOMA OF PROSTATE (H): Primary | ICD-10-CM

## 2025-04-16 DIAGNOSIS — Z19.1 METASTATIC CASTRATION-SENSITIVE ADENOCARCINOMA OF PROSTATE (H): Primary | ICD-10-CM

## 2025-04-18 ENCOUNTER — LAB (OUTPATIENT)
Dept: LAB | Facility: OTHER | Age: 85
End: 2025-04-18
Payer: MEDICARE

## 2025-04-18 DIAGNOSIS — Z19.1 METASTATIC CASTRATION-SENSITIVE ADENOCARCINOMA OF PROSTATE (H): ICD-10-CM

## 2025-04-18 DIAGNOSIS — C61 METASTATIC CASTRATION-SENSITIVE ADENOCARCINOMA OF PROSTATE (H): ICD-10-CM

## 2025-04-18 LAB
ALBUMIN SERPL BCG-MCNC: 3.9 G/DL (ref 3.5–5.2)
ALP SERPL-CCNC: 69 U/L (ref 40–150)
ALT SERPL W P-5'-P-CCNC: 15 U/L (ref 0–70)
ANION GAP SERPL CALCULATED.3IONS-SCNC: 10 MMOL/L (ref 7–15)
AST SERPL W P-5'-P-CCNC: 23 U/L (ref 0–45)
BASOPHILS # BLD AUTO: 0.1 10E3/UL (ref 0–0.2)
BASOPHILS NFR BLD AUTO: 1 %
BILIRUB SERPL-MCNC: 0.7 MG/DL
BUN SERPL-MCNC: 23.1 MG/DL (ref 8–23)
CALCIUM SERPL-MCNC: 9.4 MG/DL (ref 8.8–10.4)
CHLORIDE SERPL-SCNC: 101 MMOL/L (ref 98–107)
CREAT SERPL-MCNC: 1.2 MG/DL (ref 0.67–1.17)
EGFRCR SERPLBLD CKD-EPI 2021: 60 ML/MIN/1.73M2
EOSINOPHIL # BLD AUTO: 0.3 10E3/UL (ref 0–0.7)
EOSINOPHIL NFR BLD AUTO: 8 %
ERYTHROCYTE [DISTWIDTH] IN BLOOD BY AUTOMATED COUNT: 12.8 % (ref 10–15)
GLUCOSE SERPL-MCNC: 106 MG/DL (ref 70–99)
HCO3 SERPL-SCNC: 26 MMOL/L (ref 22–29)
HCT VFR BLD AUTO: 36.9 % (ref 40–53)
HGB BLD-MCNC: 12.6 G/DL (ref 13.3–17.7)
IMM GRANULOCYTES # BLD: 0 10E3/UL
IMM GRANULOCYTES NFR BLD: 1 %
LDH SERPL L TO P-CCNC: 186 U/L (ref 0–250)
LYMPHOCYTES # BLD AUTO: 1 10E3/UL (ref 0.8–5.3)
LYMPHOCYTES NFR BLD AUTO: 26 %
MCH RBC QN AUTO: 30.3 PG (ref 26.5–33)
MCHC RBC AUTO-ENTMCNC: 34.1 G/DL (ref 31.5–36.5)
MCV RBC AUTO: 89 FL (ref 78–100)
MONOCYTES # BLD AUTO: 0.4 10E3/UL (ref 0–1.3)
MONOCYTES NFR BLD AUTO: 10 %
NEUTROPHILS # BLD AUTO: 2.1 10E3/UL (ref 1.6–8.3)
NEUTROPHILS NFR BLD AUTO: 53 %
NRBC # BLD AUTO: 0 10E3/UL
NRBC BLD AUTO-RTO: 0 /100
PLATELET # BLD AUTO: 199 10E3/UL (ref 150–450)
POTASSIUM SERPL-SCNC: 4.5 MMOL/L (ref 3.4–5.3)
PROT SERPL-MCNC: 6.8 G/DL (ref 6.4–8.3)
PSA SERPL DL<=0.01 NG/ML-MCNC: 0.22 NG/ML
RBC # BLD AUTO: 4.16 10E6/UL (ref 4.4–5.9)
SODIUM SERPL-SCNC: 137 MMOL/L (ref 135–145)
WBC # BLD AUTO: 3.9 10E3/UL (ref 4–11)

## 2025-04-18 PROCEDURE — 83615 LACTATE (LD) (LDH) ENZYME: CPT | Mod: ZL

## 2025-04-18 PROCEDURE — 84403 ASSAY OF TOTAL TESTOSTERONE: CPT | Mod: ZL

## 2025-04-18 PROCEDURE — 82310 ASSAY OF CALCIUM: CPT | Mod: ZL

## 2025-04-18 PROCEDURE — 82040 ASSAY OF SERUM ALBUMIN: CPT | Mod: ZL

## 2025-04-18 PROCEDURE — 85004 AUTOMATED DIFF WBC COUNT: CPT | Mod: ZL

## 2025-04-18 PROCEDURE — 36415 COLL VENOUS BLD VENIPUNCTURE: CPT | Mod: ZL

## 2025-04-18 PROCEDURE — 84153 ASSAY OF PSA TOTAL: CPT | Mod: ZL

## 2025-04-21 LAB — TESTOST SERPL-MCNC: 8 NG/DL (ref 240–950)

## 2025-04-22 DIAGNOSIS — Z19.1 METASTATIC CASTRATION-SENSITIVE ADENOCARCINOMA OF PROSTATE (H): Primary | ICD-10-CM

## 2025-04-22 DIAGNOSIS — C61 METASTATIC CASTRATION-SENSITIVE ADENOCARCINOMA OF PROSTATE (H): Primary | ICD-10-CM

## 2025-04-29 ENCOUNTER — ONCOLOGY VISIT (OUTPATIENT)
Dept: ONCOLOGY | Facility: OTHER | Age: 85
End: 2025-04-29
Attending: INTERNAL MEDICINE
Payer: MEDICARE

## 2025-04-29 VITALS
BODY MASS INDEX: 28.37 KG/M2 | OXYGEN SATURATION: 100 % | HEART RATE: 59 BPM | RESPIRATION RATE: 14 BRPM | TEMPERATURE: 96.8 F | WEIGHT: 215 LBS | SYSTOLIC BLOOD PRESSURE: 148 MMHG | DIASTOLIC BLOOD PRESSURE: 70 MMHG

## 2025-04-29 DIAGNOSIS — C61 METASTATIC CASTRATION-SENSITIVE ADENOCARCINOMA OF PROSTATE (H): Primary | ICD-10-CM

## 2025-04-29 DIAGNOSIS — Z19.1 METASTATIC CASTRATION-SENSITIVE ADENOCARCINOMA OF PROSTATE (H): Primary | ICD-10-CM

## 2025-04-29 PROCEDURE — G0463 HOSPITAL OUTPT CLINIC VISIT: HCPCS

## 2025-04-29 ASSESSMENT — PAIN SCALES - GENERAL: PAINLEVEL_OUTOF10: NO PAIN (0)

## 2025-04-29 NOTE — PROGRESS NOTES
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      History of Present Illness    Mr. 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  For details of history previous notes.  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 daily, Lupron 22.5 mg IM every 3 months, as well as Xgeva 120 mg subcutaneously monthly.  Patient developed gross hematuria recently was evaluated with Dr. 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  He was told to stop Eliquis.  The patient decrease Eliquis dose to 5 mg p.o. daily and has not had any further hematuria..  Continues to be in A-fib and requires Eliquis nonetheless.Otherwise he says he gets occasional hot flashes.  He denies hematuria or flank pain.  He denies urinary frequency or urgency.  He denies any bone pain.  He denies dyspnea, cough or hemoptysis.  Denies abdominal pain.  Denies change in bowel habits.  Denies bright red blood per rectum, hematemesis or melena.  His PSA has dropped from 14.95 down to 0.22.      ______________________________________________________________________________    Past History    Past Medical History:   Diagnosis Date    Benign neoplasm of colon     30 centimeters that returned as tubular adenoma with low-grade dysplasia.    Esophageal reflux     No Comments Provided    Hypertrophy of prostate without urinary obstruction and other lower urinary tract symptoms (LUTS)     PSA 3.04 10/00;  3.4 03/05; 5.5  04/08.       Past Surgical History:   Procedure Laterality Date    BIOPSY      biopsy of prostate- needle/punch    COLONOSCOPY  05/07/2018    Dr. Humphreys, [facility]    GENITOURINARY SURGERY  01/06/2012    TURP    GENITOURINARY SURGERY  2008    cystoscopy    OTHER SURGICAL HISTORY      [id number],([place]) FACILITY COLORECTAL CA SCREEN FLEX SCOPE           Review of systems.  CNS: There are no headaches, no blurred vision, no change in mental status,   ENT: There is no hearing loss.  Respiratory:There is no cough,dyspnea or hemoptysis  Cardiac: There is no chest pain, orthopnea, PND, or ankle edema.  GI: There is no bright red blood per rectum, no hematemesis, no reflux, no diarrhea or constipation  Musculoskeletal: There are no joint pains or bone pain  : There is no urinary frequency, hematuria.  Constitutional: There is no fevers, night sweats, weight loss.  Endocrine: There is no fatigue  Neuro: There is no tingling or numbness in the hands or feet.  Hematologic: There is no gingival bleeding, epistaxis, or easy bruisability.  Dermatologic: There is no skin rash.  A 14 point review of systems is otherwise negative.          Physical Exam    BP (!) 148/70   Pulse 59   Temp 96.8  F (36  C)   Resp 14   Wt 97.5 kg (215 lb)   SpO2 100%   BMI 28.37 kg/m        GENERAL: Alert and oriented to time place and person. Seated comfortably. In no distress.    HEAD: Atraumatic and normocephalic.    EYES: JAYLA, EOMI.  No pallor.  No icterus.    Oral cavity: no mucosal lesion or tonsillar enlargement.    NECK: supple. JVP normal.  No thyroid enlargement.    LYMPH NODES: There are no palpable cervical, supraclavicular, axillary, or inguinal nodes.    LUNGS: clear to auscultation bilaterally.  Resonant to percussion throughout bilaterally.  Symmetrical breath movements bilaterally.    HEART: S1 and S2 are heard.  Irregular, irregular  rate and rhythm.  No murmur or gallop or rub heard.      ABDOMEN: Soft. Not  tender. Not distended.  No palpable hepatomegaly or splenomegaly.  No other mass palpable.  Normoactive bowel sounds heard.    EXTREMITIES: There is no ankle edema.  SKIN: no rash, or bruising or purpura.    NEURO: Grossly non-focal.    Lab Results  Component      Latest Ref [facility] 4/18/2025  8:09 AM   WBC      4.0 - 11.0 10e3/uL 3.9 (L)    RBC Count      4.40 - 5.90 10e6/uL 4.16 (L)    Hemoglobin      13.3 - 17.7 g/dL 12.6 (L)    Hematocrit      40.0 - 53.0 % 36.9 (L)    MCV      78 - 100 fL 89    MCH      26.5 - 33.0 pg 30.3    MCHC      31.5 - 36.5 g/dL 34.1    RDW      10.0 - 15.0 % 12.8    Platelet Count      150 - 450 10e3/uL 199    % Neutrophils      % 53    % Lymphocytes      % 26    % Monocytes      % 10    % Eosinophils      % 8    % Basophils      % 1    % Immature Granulocytes      % 1    NRBC/W      <1 /100 0    Absolute Neutrophil      1.6 - 8.3 10e3/uL 2.1    Absolute Lymphocytes      0.8 - 5.3 10e3/uL 1.0    Absolute Monocytes      0.0 - 1.3 10e3/uL 0.4    Absolute Eosinophils      0.0 - 0.7 10e3/uL 0.3    Absolute Basophils      0.0 - 0.2 10e3/uL 0.1    Absolute Immature Granulocytes      <=0.4 10e3/uL 0.0    Absolute NRBCs      10e3/uL 0.0    Sodium      135 - 145 mmol/L 137    Potassium      3.4 - 5.3 mmol/L 4.5    Carbon Dioxide (CO2)      22 - 29 mmol/L 26    Anion Gap      7 - 15 mmol/L 10    Urea Nitrogen      8.0 - 23.0 mg/dL 23.1 (H)    Creatinine      0.67 - 1.17 mg/dL 1.20 (H)    GFR Estimate      >60 mL/min/1.73m2 60 (L)    Calcium      8.8 - 10.4 mg/dL 9.4    Chloride      98 - 107 mmol/L 101    Glucose      70 - 99 mg/dL 106 (H)    Alkaline Phosphatase      40 - 150 U/L 69    AST      0 - 45 U/L 23    ALT      0 - 70 U/L 15    Protein Total      6.4 - 8.3 g/dL 6.8    Albumin      3.5 - 5.2 g/dL 3.9    Bilirubin Total      <=1.2 mg/dL 0.7    Lactate Dehydrogenase      0 - 250 U/L 186    PSA Tumor Marker      ng/mL 0.22    Testosterone Total      240 - 950 ng/dL 8 (L)    Phosphorus      2.5  - 4.5 mg/dL       Legend:  (L) Low  (H) High      Imaging    No results found.    Assessment and Plan: #1 :.  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  Patient was deemed to be a candidate for enzalutamide/Lupron Xgeva his PSA dropping significantly from 14.95 down to 0.22.  The plan is to continue Lupron/enzalutamide/Xgeva continue to monitor PSA tumor marker.  Time spent: 50 minutes was spent on this patient visit : we spent time reviewing lab results and discussing lab results with patient, performing a history/physical, documenting history/physical, and ordering follow-up labs and appointments.  The longitudinal plan of care for the diagnosis(es)/condition(s) as documented were addressed during this visit. Due to the added complexity in care, I will continue to support Colten in the subsequent management and with ongoing continuity of care.      Cancer Staging   No matching staging information was found for the patient.        Signed by: Dewey Herrera MD    CC: German Lu MD

## 2025-04-29 NOTE — NURSING NOTE
"Oncology Rooming Note    April 29, 2025 9:38 AM   Po Blanton is a 84 year old male who presents for:    Chief Complaint   Patient presents with    Oncology Clinic Visit     Prostate cancer     Initial Vitals: There were no vitals taken for this visit. Estimated body mass index is 26.91 kg/m  as calculated from the following:    Height as of 4/18/25: 1.854 m (6' 1\").    Weight as of 4/18/25: 92.5 kg (204 lb). There is no height or weight on file to calculate BSA.  Data Unavailable Comment: Data Unavailable   No LMP for male patient.  Allergies reviewed: Yes  Medications reviewed: Yes    Medications: Medication refills not needed today.  Pharmacy name entered into [employer]:    [id number] - [place], [place] - [address]  [facility] - [place], [place] - [address]     Frailty Screening:   Is the patient here for a new oncology consult visit in cancer care? 2. No    PHQ9:  Did this patient require a PHQ9?: No      Clinical concerns: follow up       Antonia Solano RN              "

## 2025-05-19 ENCOUNTER — HOSPITAL ENCOUNTER (OUTPATIENT)
Dept: INFUSION THERAPY | Facility: OTHER | Age: 85
Discharge: HOME | End: 2025-05-19
Attending: INTERNAL MEDICINE
Payer: MEDICARE

## 2025-05-19 ENCOUNTER — DOCUMENTATION ONLY (OUTPATIENT)
Dept: ONCOLOGY | Facility: OTHER | Age: 85
End: 2025-05-19

## 2025-05-19 ENCOUNTER — LAB (OUTPATIENT)
Dept: LAB | Facility: OTHER | Age: 85
End: 2025-05-19
Attending: INTERNAL MEDICINE
Payer: MEDICARE

## 2025-05-19 VITALS
BODY MASS INDEX: 28.39 KG/M2 | RESPIRATION RATE: 16 BRPM | HEART RATE: 55 BPM | WEIGHT: 215.2 LBS | SYSTOLIC BLOOD PRESSURE: 144 MMHG | DIASTOLIC BLOOD PRESSURE: 76 MMHG | TEMPERATURE: 96.2 F

## 2025-05-19 DIAGNOSIS — Z19.1 METASTATIC CASTRATION-SENSITIVE ADENOCARCINOMA OF PROSTATE (H): Primary | ICD-10-CM

## 2025-05-19 DIAGNOSIS — Z19.1 METASTATIC CASTRATION-SENSITIVE ADENOCARCINOMA OF PROSTATE (H): ICD-10-CM

## 2025-05-19 DIAGNOSIS — C61 METASTATIC CASTRATION-SENSITIVE ADENOCARCINOMA OF PROSTATE (H): Primary | ICD-10-CM

## 2025-05-19 DIAGNOSIS — C61 METASTATIC CASTRATION-SENSITIVE ADENOCARCINOMA OF PROSTATE (H): ICD-10-CM

## 2025-05-19 LAB
ALBUMIN SERPL BCG-MCNC: 3.9 G/DL (ref 3.5–5.2)
ALP SERPL-CCNC: 65 U/L (ref 40–150)
ALT SERPL W P-5'-P-CCNC: 14 U/L (ref 0–70)
ANION GAP SERPL CALCULATED.3IONS-SCNC: 7 MMOL/L (ref 7–15)
AST SERPL W P-5'-P-CCNC: 23 U/L (ref 0–45)
BASOPHILS # BLD AUTO: 0.1 10E3/UL (ref 0–0.2)
BASOPHILS NFR BLD AUTO: 1 %
BILIRUB SERPL-MCNC: 0.5 MG/DL
BUN SERPL-MCNC: 23.5 MG/DL (ref 8–23)
CALCIUM SERPL-MCNC: 9.3 MG/DL (ref 8.8–10.4)
CALCIUM SERPL-MCNC: 9.3 MG/DL (ref 8.8–10.4)
CHLORIDE SERPL-SCNC: 105 MMOL/L (ref 98–107)
CREAT SERPL-MCNC: 1.38 MG/DL (ref 0.67–1.17)
EGFRCR SERPLBLD CKD-EPI 2021: 51 ML/MIN/1.73M2
EOSINOPHIL # BLD AUTO: 0.4 10E3/UL (ref 0–0.7)
EOSINOPHIL NFR BLD AUTO: 8 %
ERYTHROCYTE [DISTWIDTH] IN BLOOD BY AUTOMATED COUNT: 12.7 % (ref 10–15)
GLUCOSE SERPL-MCNC: 108 MG/DL (ref 70–99)
HCO3 SERPL-SCNC: 27 MMOL/L (ref 22–29)
HCT VFR BLD AUTO: 36.9 % (ref 40–53)
HGB BLD-MCNC: 12.6 G/DL (ref 13.3–17.7)
IMM GRANULOCYTES # BLD: 0 10E3/UL
IMM GRANULOCYTES NFR BLD: 1 %
LDH SERPL L TO P-CCNC: 193 U/L (ref 0–250)
LYMPHOCYTES # BLD AUTO: 1.2 10E3/UL (ref 0.8–5.3)
LYMPHOCYTES NFR BLD AUTO: 25 %
MCH RBC QN AUTO: 30.4 PG (ref 26.5–33)
MCHC RBC AUTO-ENTMCNC: 34.1 G/DL (ref 31.5–36.5)
MCV RBC AUTO: 89 FL (ref 78–100)
MONOCYTES # BLD AUTO: 0.6 10E3/UL (ref 0–1.3)
MONOCYTES NFR BLD AUTO: 12 %
NEUTROPHILS # BLD AUTO: 2.5 10E3/UL (ref 1.6–8.3)
NEUTROPHILS NFR BLD AUTO: 52 %
NRBC # BLD AUTO: 0 10E3/UL
NRBC BLD AUTO-RTO: 0 /100
PHOSPHATE SERPL-MCNC: 3.9 MG/DL (ref 2.5–4.5)
PLATELET # BLD AUTO: 193 10E3/UL (ref 150–450)
POTASSIUM SERPL-SCNC: 5.2 MMOL/L (ref 3.4–5.3)
PROT SERPL-MCNC: 6.9 G/DL (ref 6.4–8.3)
PSA SERPL DL<=0.01 NG/ML-MCNC: 0.18 NG/ML
RBC # BLD AUTO: 4.14 10E6/UL (ref 4.4–5.9)
SODIUM SERPL-SCNC: 139 MMOL/L (ref 135–145)
WBC # BLD AUTO: 4.8 10E3/UL (ref 4–11)

## 2025-05-19 PROCEDURE — 250N000011 HC RX IP 250 OP 636: Mod: JZ | Performed by: INTERNAL MEDICINE

## 2025-05-19 PROCEDURE — 84100 ASSAY OF PHOSPHORUS: CPT | Performed by: INTERNAL MEDICINE

## 2025-05-19 PROCEDURE — 84153 ASSAY OF PSA TOTAL: CPT | Mod: ZL

## 2025-05-19 PROCEDURE — 85025 COMPLETE CBC W/AUTO DIFF WBC: CPT | Mod: ZL

## 2025-05-19 PROCEDURE — 83615 LACTATE (LD) (LDH) ENZYME: CPT | Mod: ZL

## 2025-05-19 PROCEDURE — 82435 ASSAY OF BLOOD CHLORIDE: CPT | Mod: ZL

## 2025-05-19 PROCEDURE — 96372 THER/PROPH/DIAG INJ SC/IM: CPT | Performed by: INTERNAL MEDICINE

## 2025-05-19 PROCEDURE — 36415 COLL VENOUS BLD VENIPUNCTURE: CPT | Mod: ZL

## 2025-05-19 PROCEDURE — 84403 ASSAY OF TOTAL TESTOSTERONE: CPT | Mod: ZL

## 2025-05-19 PROCEDURE — 82310 ASSAY OF CALCIUM: CPT | Performed by: INTERNAL MEDICINE

## 2025-05-19 RX ORDER — MEPERIDINE HYDROCHLORIDE 25 MG/ML
25 INJECTION INTRAMUSCULAR; INTRAVENOUS; SUBCUTANEOUS
OUTPATIENT
Start: 2025-06-17

## 2025-05-19 RX ORDER — DIPHENHYDRAMINE HYDROCHLORIDE 50 MG/ML
50 INJECTION, SOLUTION INTRAMUSCULAR; INTRAVENOUS
Start: 2025-06-17

## 2025-05-19 RX ORDER — METHYLPREDNISOLONE SODIUM SUCCINATE 40 MG/ML
40 INJECTION INTRAMUSCULAR; INTRAVENOUS
Start: 2025-06-17

## 2025-05-19 RX ORDER — DIPHENHYDRAMINE HYDROCHLORIDE 50 MG/ML
25 INJECTION, SOLUTION INTRAMUSCULAR; INTRAVENOUS
Start: 2025-06-17

## 2025-05-19 RX ORDER — EPINEPHRINE 1 MG/ML
0.3 INJECTION, SOLUTION, CONCENTRATE INTRAVENOUS EVERY 5 MIN PRN
OUTPATIENT
Start: 2025-06-17

## 2025-05-19 RX ORDER — ALBUTEROL SULFATE 90 UG/1
1-2 INHALANT RESPIRATORY (INHALATION)
Start: 2025-06-17

## 2025-05-19 RX ORDER — ALBUTEROL SULFATE 0.83 MG/ML
2.5 SOLUTION RESPIRATORY (INHALATION)
OUTPATIENT
Start: 2025-06-17

## 2025-05-19 RX ADMIN — DENOSUMAB 120 MG: 120 INJECTION SUBCUTANEOUS at 09:25

## 2025-05-19 NOTE — NURSING NOTE
Infusion Nursing Note:  Po COTTRELL Temoever presents today for Xgeva.    Patient seen by provider today: No   present during visit today: Not Applicable.    Note: N/A.      Intravenous Access:  Labs drawn without difficulty by .    Treatment Conditions:  Lab Results   Component Value Date     05/19/2025    POTASSIUM 5.2 05/19/2025    MAG 2.0 11/07/2023    CR 1.38 (H) 05/19/2025    VIRGIL 9.3 05/19/2025    VIRGIL 9.3 05/19/2025    BILITOTAL 0.5 05/19/2025    ALBUMIN 3.9 05/19/2025    ALT 14 05/19/2025    AST 23 05/19/2025   Phosphorus resulted at 3.9 with today's lab work.    Results reviewed, labs MET treatment parameters, ok to proceed with treatment.      Post Infusion Assessment:  Patient tolerated injection to LUE without incident.  Site intact, free from redness, edema or discomfort.       Discharge Plan:   Discharge instructions reviewed with: Patient.  Patient and/or family verbalized understanding of discharge instructions and all questions answered.  Copy of AVS reviewed with patient and/or family.  Patient will return 6/18/2025 for next appointment.  Patient discharged in stable condition accompanied by: self.  Departure Mode: Ambulatory.      Umu Rankin RN

## 2025-05-20 ENCOUNTER — PATIENT OUTREACH (OUTPATIENT)
Dept: ONCOLOGY | Facility: OTHER | Age: 85
End: 2025-05-20
Payer: COMMERCIAL

## 2025-05-20 ENCOUNTER — RESULTS FOLLOW-UP (OUTPATIENT)
Dept: ONCOLOGY | Facility: OTHER | Age: 85
End: 2025-05-20
Payer: COMMERCIAL

## 2025-05-20 ENCOUNTER — RESULTS FOLLOW-UP (OUTPATIENT)
Dept: ONCOLOGY | Facility: OTHER | Age: 85
End: 2025-05-20

## 2025-05-20 NOTE — PROGRESS NOTES
Colten's daughter, Yandy, called with questions about his recent labs. Labs have not been reviewed by provider at this time. PSA tumor marker continues to drop. Yandy expressed thanks for the call back and reassurance about lab work.  Isabelle Moscoso RN

## 2025-05-22 LAB — TESTOST SERPL-MCNC: 6 NG/DL (ref 240–950)

## 2025-05-27 DIAGNOSIS — Z19.1 METASTATIC CASTRATION-SENSITIVE ADENOCARCINOMA OF PROSTATE (H): Primary | ICD-10-CM

## 2025-05-27 DIAGNOSIS — C61 METASTATIC CASTRATION-SENSITIVE ADENOCARCINOMA OF PROSTATE (H): Primary | ICD-10-CM

## 2025-05-27 DIAGNOSIS — N40.0 BPH WITHOUT OBSTRUCTION/LOWER URINARY TRACT SYMPTOMS: ICD-10-CM

## 2025-05-27 DIAGNOSIS — R31.0 GROSS HEMATURIA: ICD-10-CM

## 2025-06-05 ENCOUNTER — RESULTS FOLLOW-UP (OUTPATIENT)
Dept: UROLOGY | Facility: OTHER | Age: 85
End: 2025-06-05

## 2025-06-05 ENCOUNTER — LAB (OUTPATIENT)
Dept: LAB | Facility: OTHER | Age: 85
End: 2025-06-05
Attending: UROLOGY
Payer: MEDICARE

## 2025-06-05 ENCOUNTER — OFFICE VISIT (OUTPATIENT)
Dept: UROLOGY | Facility: OTHER | Age: 85
End: 2025-06-05
Attending: UROLOGY
Payer: MEDICARE

## 2025-06-05 VITALS
TEMPERATURE: 96.3 F | OXYGEN SATURATION: 100 % | SYSTOLIC BLOOD PRESSURE: 150 MMHG | HEART RATE: 54 BPM | DIASTOLIC BLOOD PRESSURE: 100 MMHG | RESPIRATION RATE: 14 BRPM

## 2025-06-05 DIAGNOSIS — R31.0 GROSS HEMATURIA: ICD-10-CM

## 2025-06-05 DIAGNOSIS — N30.40 RADIATION CYSTITIS: ICD-10-CM

## 2025-06-05 DIAGNOSIS — N20.0 RENAL CALCULUS, LEFT: ICD-10-CM

## 2025-06-05 DIAGNOSIS — C61 METASTATIC CASTRATION-SENSITIVE ADENOCARCINOMA OF PROSTATE (H): Primary | ICD-10-CM

## 2025-06-05 DIAGNOSIS — Z19.1 METASTATIC CASTRATION-SENSITIVE ADENOCARCINOMA OF PROSTATE (H): ICD-10-CM

## 2025-06-05 DIAGNOSIS — N40.0 BPH WITHOUT OBSTRUCTION/LOWER URINARY TRACT SYMPTOMS: ICD-10-CM

## 2025-06-05 DIAGNOSIS — C61 METASTATIC CASTRATION-SENSITIVE ADENOCARCINOMA OF PROSTATE (H): ICD-10-CM

## 2025-06-05 DIAGNOSIS — Z87.898 HISTORY OF GROSS HEMATURIA: ICD-10-CM

## 2025-06-05 DIAGNOSIS — Z19.1 METASTATIC CASTRATION-SENSITIVE ADENOCARCINOMA OF PROSTATE (H): Primary | ICD-10-CM

## 2025-06-05 LAB
ALBUMIN UR-MCNC: 10 MG/DL
APPEARANCE UR: CLEAR
BILIRUB UR QL STRIP: NEGATIVE
COLOR UR AUTO: ABNORMAL
GLUCOSE UR STRIP-MCNC: NEGATIVE MG/DL
HGB UR QL STRIP: NEGATIVE
KETONES UR STRIP-MCNC: NEGATIVE MG/DL
LEUKOCYTE ESTERASE UR QL STRIP: NEGATIVE
NITRATE UR QL: NEGATIVE
PH UR STRIP: 6 [PH] (ref 5–9)
SP GR UR STRIP: 1.03 (ref 1–1.03)
UROBILINOGEN UR STRIP-MCNC: NORMAL MG/DL

## 2025-06-05 PROCEDURE — 3077F SYST BP >= 140 MM HG: CPT | Performed by: UROLOGY

## 2025-06-05 PROCEDURE — 81003 URINALYSIS AUTO W/O SCOPE: CPT | Mod: ZL

## 2025-06-05 PROCEDURE — 3080F DIAST BP >= 90 MM HG: CPT | Performed by: UROLOGY

## 2025-06-05 PROCEDURE — 99213 OFFICE O/P EST LOW 20 MIN: CPT | Performed by: UROLOGY

## 2025-06-05 PROCEDURE — G0463 HOSPITAL OUTPT CLINIC VISIT: HCPCS | Performed by: UROLOGY

## 2025-06-05 RX ORDER — DORZOLAMIDE HYDROCHLORIDE AND TIMOLOL MALEATE 20; 5 MG/ML; MG/ML
1 SOLUTION/ DROPS OPHTHALMIC 2 TIMES DAILY
COMMUNITY
Start: 2025-05-29

## 2025-06-05 NOTE — PROGRESS NOTES
Chief Complaint: Follow Up (Prostate cancer/)  .    HPI: Mr. Po Blanton is a 85 year old year old male presenting today June 5, 2025 in follow up for evaluation of intermediate risk, group grade 2, castrate sensitive prostate cancer status post EBRT in 2013.    History of TURP in 2012.    History of gross hematuria with cystoscopy demonstrating radiation cystitis.  Cytology was negative.    Pet imaging 11/20/2024 positive for metastatic disease in his left eighth rib.  Seen by oncology and started on enzalutamide with Lupron.  On Xgeva for osseous metastatic bone disease.    Known left renal calculus nonobstructing.  That is being watched only    Recent PSA 0.18 in May with oncology which is improved.  Today's UA is clear of blood.    Did have mild blood in urine a month ago.  None since.      Past Medical History:   Diagnosis Date    Benign neoplasm of colon     30 centimeters that returned as tubular adenoma with low-grade dysplasia.    Esophageal reflux     No Comments Provided    Hypertrophy of prostate without urinary obstruction and other lower urinary tract symptoms (LUTS)     PSA 3.04 10/00;  3.4 03/05; 5.5 04/08.       Past Surgical History:   Procedure Laterality Date    BIOPSY      biopsy of prostate- needle/punch    COLONOSCOPY  05/07/2018    Dr. Humphreys, [facility]    GENITOURINARY SURGERY  01/06/2012    TURP    GENITOURINARY SURGERY  2008    cystoscopy    OTHER SURGICAL HISTORY      2000,[id number],([place]) FACILITY COLORECTAL CA SCREEN FLEX SCOPE       Current Outpatient Medications   Medication Sig Dispense Refill    APIXABAN PO Take 5 mg by mouth daily       Ascorbic Acid (VITAMIN C PO) Take 1 tablet by mouth daily.      Calcium-Magnesium-Zinc 333-133-5 MG TABS per tablet Take 2 tablets by mouth daily.      dorzolamide-timolol (COSOPT) 2-0.5 % ophthalmic solution Place 1 drop Into the left eye 2 times daily.      enzalutamide (XTANDI) 80 MG tablet Take 2 tablets (160 mg) by mouth daily.  60 tablet 0    hydrochlorothiazide (HYDRODIURIL) 25 MG tablet Take 25 mg by mouth daily.      lisinopril (ZESTRIL) 40 MG tablet 40 mg.      Multiple Vitamins-Minerals (ONE DAILY MULTIVITAMIN MEN) TABS Take 1 tablet by mouth daily.      Multiple Vitamins-Minerals (PRESERVISION AREDS 2 PO) Take 1 capsule by mouth daily.      prochlorperazine (COMPAZINE) 10 MG tablet Take 1 tablet (10 mg) by mouth every 6 hours as needed for nausea or vomiting. 30 tablet 2    triamcinolone (KENALOG) 0.1 % external cream Apply topically 2 times daily as needed for irritation. 45 g 1    Multiple Vitamins-Minerals (ZINC PO) Take 1 tablet by mouth daily. (Patient not taking: Reported on 6/5/2025)         ALLERGIES: Patient has no known allergies.     GENERAL PHYSICAL EXAM:   Vitals: BP (!) 150/100 (Patient Position: Sitting, Cuff Size: Adult Large)   Pulse 54   Temp (!) 96.3  F (35.7  C)   Resp 14   SpO2 100%   There is no height or weight on file to calculate BMI.    GENERAL: Well groomed, well developed, well nourished male in NAD.  ENT:  ENT exam normal  NEURO: Alert and oriented x 3.  PSYCH: Normal mood and affect, pleasant and agreeable during interview and exam.    :  Prostate deferred     RADIOLOGY: The following tests were reviewed: None today    LABS: The last test results for Mr. Po Blanton were reviewed:  Results for orders placed or performed in visit on 06/05/25 (from the past 24 hours)   Urinalysis Macroscopic   Result Value Ref Range    Color Urine Light Yellow Colorless, Straw, Light Yellow, Yellow    Appearance Urine Clear Clear    Glucose Urine Negative Negative mg/dL    Bilirubin Urine Negative Negative    Ketones Urine Negative Negative mg/dL    Specific Gravity Urine 1.026 1.000 - 1.030    Blood Urine Negative Negative    pH Urine 6.0 5.0 - 9.0    Protein Albumin Urine 10 (A) Negative mg/dL    Urobilinogen Urine Normal Normal mg/dL    Nitrite Urine Negative Negative    Leukocyte Esterase Urine Negative  Negative       PSA -   Lab Results   Component Value Date    PSA 0.18 05/19/2025    PSA 0.22 04/18/2025    PSA 0.36 03/19/2025    PSA 0.68 02/18/2025    PSA 0.84 01/14/2025    PSA 1.86 12/18/2024    PSA 14.95 11/19/2024    PSA 2.35 01/09/2023     BMP -   Recent Labs   Lab Test 05/19/25  0818 04/18/25  0815 04/18/25  0809 03/19/25  0817 11/07/23  1218 11/07/23  1150     --  137 137   < > 138   POTASSIUM 5.2  --  4.5 4.6   < > 4.5   CHLORIDE 105  --  101 103   < > 103   CO2 27  --  26 26   < > 26   BUN 23.5*  --  23.1* 15.9   < > 17.1   CR 1.38*  --  1.20* 1.11   < > 1.14   *  --  106* 102*   < > 97   VIRGIL 9.3  9.3  --  9.4 9.1   < > 9.7   MAG  --   --   --   --   --  2.0   PHOS 3.9 3.9  --  3.9   < >  --     < > = values in this interval not displayed.       CBC -   Recent Labs   Lab Test 05/19/25  0818 04/18/25  0809 03/19/25  0817   WBC 4.8 3.9* 3.8*   HGB 12.6* 12.6* 13.4    199 223       ASSESSMENT:   History of gross hematuria  BPH with LUTS  Castrate sensitive metastatic prostate cancer on Lupron, enzalutamide  Left renal calculus    PLAN:   Patient doing well with good prostate cancer control.  PSA actually improved.    Remains on Xtandi and Lupron.  Continues to follow with oncology.    If visible blood were to return, I recommend repeat cystoscopy/cytology.    Exercise and vit D and calcium encouraged.    Follow up 6 months.      11 minutes spent on the date of this encounter doing chart review, history and exam, documentation and further activities as noted above.      Abraham Reina MD  [facility]

## 2025-06-17 DIAGNOSIS — C61 METASTATIC CASTRATION-SENSITIVE ADENOCARCINOMA OF PROSTATE (H): Primary | ICD-10-CM

## 2025-06-17 DIAGNOSIS — Z19.1 METASTATIC CASTRATION-SENSITIVE ADENOCARCINOMA OF PROSTATE (H): Primary | ICD-10-CM

## 2025-06-18 ENCOUNTER — APPOINTMENT (OUTPATIENT)
Dept: LAB | Facility: OTHER | Age: 85
End: 2025-06-18
Attending: INTERNAL MEDICINE
Payer: MEDICARE

## 2025-06-18 ENCOUNTER — HOSPITAL ENCOUNTER (OUTPATIENT)
Dept: INFUSION THERAPY | Facility: OTHER | Age: 85
Discharge: HOME | End: 2025-06-18
Attending: INTERNAL MEDICINE
Payer: MEDICARE

## 2025-06-18 ENCOUNTER — RESULTS FOLLOW-UP (OUTPATIENT)
Dept: ONCOLOGY | Facility: OTHER | Age: 85
End: 2025-06-18

## 2025-06-18 VITALS
HEART RATE: 62 BPM | SYSTOLIC BLOOD PRESSURE: 119 MMHG | BODY MASS INDEX: 28.26 KG/M2 | TEMPERATURE: 97.6 F | DIASTOLIC BLOOD PRESSURE: 48 MMHG | WEIGHT: 214.2 LBS | RESPIRATION RATE: 15 BRPM | OXYGEN SATURATION: 99 %

## 2025-06-18 DIAGNOSIS — E83.39 HYPERPHOSPHATEMIA: Primary | ICD-10-CM

## 2025-06-18 DIAGNOSIS — C61 METASTATIC CASTRATION-SENSITIVE ADENOCARCINOMA OF PROSTATE (H): Primary | ICD-10-CM

## 2025-06-18 DIAGNOSIS — Z19.1 METASTATIC CASTRATION-SENSITIVE ADENOCARCINOMA OF PROSTATE (H): Primary | ICD-10-CM

## 2025-06-18 LAB
ALBUMIN SERPL BCG-MCNC: 3.8 G/DL (ref 3.5–5.2)
ALP SERPL-CCNC: 62 U/L (ref 40–150)
ALT SERPL W P-5'-P-CCNC: 15 U/L (ref 0–70)
ANION GAP SERPL CALCULATED.3IONS-SCNC: 12 MMOL/L (ref 7–15)
AST SERPL W P-5'-P-CCNC: 24 U/L (ref 0–45)
BASOPHILS # BLD AUTO: 0 10E3/UL (ref 0–0.2)
BASOPHILS NFR BLD AUTO: 1 %
BILIRUB SERPL-MCNC: 0.6 MG/DL
BUN SERPL-MCNC: 42.3 MG/DL (ref 8–23)
CALCIUM SERPL-MCNC: 9.6 MG/DL (ref 8.8–10.4)
CALCIUM SERPL-MCNC: 9.6 MG/DL (ref 8.8–10.4)
CHLORIDE SERPL-SCNC: 106 MMOL/L (ref 98–107)
CREAT SERPL-MCNC: 1.51 MG/DL (ref 0.67–1.17)
EGFRCR SERPLBLD CKD-EPI 2021: 46 ML/MIN/1.73M2
EOSINOPHIL # BLD AUTO: 0.4 10E3/UL (ref 0–0.7)
EOSINOPHIL NFR BLD AUTO: 9 %
ERYTHROCYTE [DISTWIDTH] IN BLOOD BY AUTOMATED COUNT: 13.2 % (ref 10–15)
GLUCOSE SERPL-MCNC: 141 MG/DL (ref 70–99)
HCO3 SERPL-SCNC: 21 MMOL/L (ref 22–29)
HCT VFR BLD AUTO: 35.6 % (ref 40–53)
HGB BLD-MCNC: 11.9 G/DL (ref 13.3–17.7)
IMM GRANULOCYTES # BLD: 0 10E3/UL
IMM GRANULOCYTES NFR BLD: 1 %
LDH SERPL L TO P-CCNC: 199 U/L (ref 0–250)
LYMPHOCYTES # BLD AUTO: 1.1 10E3/UL (ref 0.8–5.3)
LYMPHOCYTES NFR BLD AUTO: 26 %
MCH RBC QN AUTO: 30.5 PG (ref 26.5–33)
MCHC RBC AUTO-ENTMCNC: 33.4 G/DL (ref 31.5–36.5)
MCV RBC AUTO: 91 FL (ref 78–100)
MONOCYTES # BLD AUTO: 0.4 10E3/UL (ref 0–1.3)
MONOCYTES NFR BLD AUTO: 10 %
NEUTROPHILS # BLD AUTO: 2.3 10E3/UL (ref 1.6–8.3)
NEUTROPHILS NFR BLD AUTO: 54 %
NRBC # BLD AUTO: 0 10E3/UL
NRBC BLD AUTO-RTO: 0 /100
PHOSPHATE SERPL-MCNC: 5 MG/DL (ref 2.5–4.5)
PLATELET # BLD AUTO: 201 10E3/UL (ref 150–450)
POTASSIUM SERPL-SCNC: 4.7 MMOL/L (ref 3.4–5.3)
PROT SERPL-MCNC: 6.7 G/DL (ref 6.4–8.3)
PSA SERPL DL<=0.01 NG/ML-MCNC: 0.15 NG/ML
RBC # BLD AUTO: 3.9 10E6/UL (ref 4.4–5.9)
SODIUM SERPL-SCNC: 139 MMOL/L (ref 135–145)
WBC # BLD AUTO: 4.3 10E3/UL (ref 4–11)

## 2025-06-18 PROCEDURE — 82310 ASSAY OF CALCIUM: CPT | Performed by: NURSE PRACTITIONER

## 2025-06-18 PROCEDURE — 83615 LACTATE (LD) (LDH) ENZYME: CPT | Performed by: NURSE PRACTITIONER

## 2025-06-18 PROCEDURE — 82435 ASSAY OF BLOOD CHLORIDE: CPT | Performed by: NURSE PRACTITIONER

## 2025-06-18 PROCEDURE — 96372 THER/PROPH/DIAG INJ SC/IM: CPT | Performed by: NURSE PRACTITIONER

## 2025-06-18 PROCEDURE — 84100 ASSAY OF PHOSPHORUS: CPT | Performed by: NURSE PRACTITIONER

## 2025-06-18 PROCEDURE — 84153 ASSAY OF PSA TOTAL: CPT | Performed by: NURSE PRACTITIONER

## 2025-06-18 PROCEDURE — 250N000011 HC RX IP 250 OP 636: Mod: JZ | Performed by: NURSE PRACTITIONER

## 2025-06-18 PROCEDURE — 85025 COMPLETE CBC W/AUTO DIFF WBC: CPT | Performed by: NURSE PRACTITIONER

## 2025-06-18 PROCEDURE — 36415 COLL VENOUS BLD VENIPUNCTURE: CPT | Performed by: NURSE PRACTITIONER

## 2025-06-18 RX ORDER — DIPHENHYDRAMINE HYDROCHLORIDE 50 MG/ML
25 INJECTION, SOLUTION INTRAMUSCULAR; INTRAVENOUS
Start: 2025-07-18

## 2025-06-18 RX ORDER — EPINEPHRINE 1 MG/ML
0.3 INJECTION, SOLUTION, CONCENTRATE INTRAVENOUS EVERY 5 MIN PRN
OUTPATIENT
Start: 2025-07-18

## 2025-06-18 RX ORDER — MEPERIDINE HYDROCHLORIDE 25 MG/ML
25 INJECTION INTRAMUSCULAR; INTRAVENOUS; SUBCUTANEOUS
OUTPATIENT
Start: 2025-07-18

## 2025-06-18 RX ORDER — DIPHENHYDRAMINE HYDROCHLORIDE 50 MG/ML
50 INJECTION, SOLUTION INTRAMUSCULAR; INTRAVENOUS
Start: 2025-07-18

## 2025-06-18 RX ORDER — ALBUTEROL SULFATE 0.83 MG/ML
2.5 SOLUTION RESPIRATORY (INHALATION)
OUTPATIENT
Start: 2025-07-18

## 2025-06-18 RX ORDER — ALBUTEROL SULFATE 90 UG/1
1-2 INHALANT RESPIRATORY (INHALATION)
Start: 2025-07-18

## 2025-06-18 RX ORDER — METHYLPREDNISOLONE SODIUM SUCCINATE 40 MG/ML
40 INJECTION INTRAMUSCULAR; INTRAVENOUS
Start: 2025-07-18

## 2025-06-18 RX ADMIN — DENOSUMAB 120 MG: 120 INJECTION SUBCUTANEOUS at 09:26

## 2025-06-18 NOTE — NURSING NOTE
Infusion Nursing Note:  Po Blanton presents today for Xgeva.    Patient seen by provider today: No   present during visit today: Not Applicable.    Note: Patients Phosphorus came back at 5.0. discussed with Mary Mosqueda NP. All labs reviewed with Mary patient appears to be dehydrated which can increase phosphorus per Mary Mosqueda NP. Patient can proceed with Xgeva but will educate patient on increasing fluids today and drinking more water and fluids in general.        Intravenous Access:  Labs drawn without difficulty.    Treatment Conditions:  Lab Results   Component Value Date     06/18/2025    POTASSIUM 4.7 06/18/2025    MAG 2.0 11/07/2023    CR 1.51 (H) 06/18/2025    VIRGIL 9.6 06/18/2025    VIRGIL 9.6 06/18/2025    BILITOTAL 0.6 06/18/2025    ALBUMIN 3.8 06/18/2025    ALT 15 06/18/2025    AST 24 06/18/2025       Results reviewed, labs MET treatment parameters, ok to proceed with treatment.      Post Infusion Assessment:  Patient tolerated injection without incident to left arm subcutaneous  Site patent and intact, free from redness, edema or discomfort.       Discharge Plan:   Discharge instructions reviewed with: Patient.  Patient and/or family verbalized understanding of discharge instructions and all questions answered.  AVS to patient via [employer].  Patient will return 7/22/25 for next appointment.   Patient discharged in stable condition accompanied by: self.  Departure Mode: Ambulatory.      Margaret Haney RN

## 2025-06-19 DIAGNOSIS — Z19.1 METASTATIC CASTRATION-SENSITIVE ADENOCARCINOMA OF PROSTATE (H): Primary | ICD-10-CM

## 2025-06-19 DIAGNOSIS — C61 METASTATIC CASTRATION-SENSITIVE ADENOCARCINOMA OF PROSTATE (H): Primary | ICD-10-CM

## 2025-07-15 DIAGNOSIS — C61 METASTATIC CASTRATION-SENSITIVE ADENOCARCINOMA OF PROSTATE (H): Primary | ICD-10-CM

## 2025-07-15 DIAGNOSIS — Z19.1 METASTATIC CASTRATION-SENSITIVE ADENOCARCINOMA OF PROSTATE (H): Primary | ICD-10-CM

## 2025-07-18 ENCOUNTER — LAB (OUTPATIENT)
Dept: LAB | Facility: OTHER | Age: 85
End: 2025-07-18
Payer: MEDICARE

## 2025-07-18 DIAGNOSIS — E83.39 HYPERPHOSPHATEMIA: ICD-10-CM

## 2025-07-18 LAB
ALBUMIN SERPL BCG-MCNC: 3.8 G/DL (ref 3.5–5.2)
ALP SERPL-CCNC: 58 U/L (ref 40–150)
ALT SERPL W P-5'-P-CCNC: 14 U/L (ref 0–70)
ANION GAP SERPL CALCULATED.3IONS-SCNC: 10 MMOL/L (ref 7–15)
AST SERPL W P-5'-P-CCNC: 22 U/L (ref 0–45)
BILIRUB SERPL-MCNC: 0.6 MG/DL
BUN SERPL-MCNC: 24.4 MG/DL (ref 8–23)
CALCIUM SERPL-MCNC: 9.2 MG/DL (ref 8.8–10.4)
CHLORIDE SERPL-SCNC: 103 MMOL/L (ref 98–107)
CREAT SERPL-MCNC: 1.32 MG/DL (ref 0.67–1.17)
EGFRCR SERPLBLD CKD-EPI 2021: 54 ML/MIN/1.73M2
GLUCOSE SERPL-MCNC: 129 MG/DL (ref 70–99)
HCO3 SERPL-SCNC: 25 MMOL/L (ref 22–29)
PHOSPHATE SERPL-MCNC: 4 MG/DL (ref 2.5–4.5)
POTASSIUM SERPL-SCNC: 4.6 MMOL/L (ref 3.4–5.3)
PROT SERPL-MCNC: 6.7 G/DL (ref 6.4–8.3)
SODIUM SERPL-SCNC: 138 MMOL/L (ref 135–145)

## 2025-07-18 PROCEDURE — 84100 ASSAY OF PHOSPHORUS: CPT | Mod: ZL

## 2025-07-18 PROCEDURE — 84155 ASSAY OF PROTEIN SERUM: CPT | Mod: ZL

## 2025-07-24 ENCOUNTER — ONCOLOGY VISIT (OUTPATIENT)
Dept: ONCOLOGY | Facility: OTHER | Age: 85
End: 2025-07-24
Attending: INTERNAL MEDICINE
Payer: MEDICARE

## 2025-07-24 ENCOUNTER — RESULTS FOLLOW-UP (OUTPATIENT)
Dept: ONCOLOGY | Facility: OTHER | Age: 85
End: 2025-07-24

## 2025-07-24 VITALS
HEART RATE: 87 BPM | SYSTOLIC BLOOD PRESSURE: 128 MMHG | DIASTOLIC BLOOD PRESSURE: 68 MMHG | BODY MASS INDEX: 27.97 KG/M2 | TEMPERATURE: 99 F | WEIGHT: 212 LBS | OXYGEN SATURATION: 94 % | RESPIRATION RATE: 16 BRPM

## 2025-07-24 DIAGNOSIS — C79.51 PROSTATE CANCER METASTATIC TO BONE (H): ICD-10-CM

## 2025-07-24 DIAGNOSIS — D64.9 ANEMIA, UNSPECIFIED TYPE: ICD-10-CM

## 2025-07-24 DIAGNOSIS — C61 METASTATIC CASTRATION-SENSITIVE ADENOCARCINOMA OF PROSTATE (H): Primary | ICD-10-CM

## 2025-07-24 DIAGNOSIS — Z19.1 METASTATIC CASTRATION-SENSITIVE ADENOCARCINOMA OF PROSTATE (H): Primary | ICD-10-CM

## 2025-07-24 DIAGNOSIS — C61 PROSTATE CANCER METASTATIC TO BONE (H): ICD-10-CM

## 2025-07-24 LAB
BASOPHILS # BLD AUTO: 0 10E3/UL (ref 0–0.2)
BASOPHILS NFR BLD AUTO: 2 %
EOSINOPHIL # BLD AUTO: 0 10E3/UL (ref 0–0.7)
EOSINOPHIL NFR BLD AUTO: 1 %
ERYTHROCYTE [DISTWIDTH] IN BLOOD BY AUTOMATED COUNT: 12.5 % (ref 10–15)
FERRITIN SERPL-MCNC: 1001 NG/ML (ref 31–409)
HCT VFR BLD AUTO: 33.9 % (ref 40–53)
HGB BLD-MCNC: 11.9 G/DL (ref 13.3–17.7)
IMM GRANULOCYTES # BLD: 0 10E3/UL
IMM GRANULOCYTES NFR BLD: 1 %
IRON BINDING CAPACITY (ROCHE): 207 UG/DL (ref 240–430)
IRON SATN MFR SERPL: 13 % (ref 15–46)
IRON SERPL-MCNC: 27 UG/DL (ref 61–157)
LYMPHOCYTES # BLD AUTO: 0.6 10E3/UL (ref 0.8–5.3)
LYMPHOCYTES NFR BLD AUTO: 31 %
MCH RBC QN AUTO: 30.5 PG (ref 26.5–33)
MCHC RBC AUTO-ENTMCNC: 35.1 G/DL (ref 31.5–36.5)
MCV RBC AUTO: 87 FL (ref 78–100)
MONOCYTES # BLD AUTO: 0.4 10E3/UL (ref 0–1.3)
MONOCYTES NFR BLD AUTO: 18 %
NEUTROPHILS # BLD AUTO: 1 10E3/UL (ref 1.6–8.3)
NEUTROPHILS NFR BLD AUTO: 48 %
NRBC # BLD AUTO: 0 10E3/UL
NRBC BLD AUTO-RTO: 0 /100
PLATELET # BLD AUTO: 118 10E3/UL (ref 150–450)
PSA SERPL DL<=0.01 NG/ML-MCNC: 0.14 NG/ML
RBC # BLD AUTO: 3.9 10E6/UL (ref 4.4–5.9)
WBC # BLD AUTO: 2 10E3/UL (ref 4–11)

## 2025-07-24 PROCEDURE — 84153 ASSAY OF PSA TOTAL: CPT | Mod: ZL | Performed by: NURSE PRACTITIONER

## 2025-07-24 PROCEDURE — 83550 IRON BINDING TEST: CPT | Mod: ZL | Performed by: NURSE PRACTITIONER

## 2025-07-24 PROCEDURE — 85004 AUTOMATED DIFF WBC COUNT: CPT | Mod: ZL | Performed by: NURSE PRACTITIONER

## 2025-07-24 PROCEDURE — 36415 COLL VENOUS BLD VENIPUNCTURE: CPT | Mod: ZL | Performed by: NURSE PRACTITIONER

## 2025-07-24 PROCEDURE — G0463 HOSPITAL OUTPT CLINIC VISIT: HCPCS

## 2025-07-24 PROCEDURE — 82728 ASSAY OF FERRITIN: CPT | Mod: ZL | Performed by: NURSE PRACTITIONER

## 2025-07-24 ASSESSMENT — PAIN SCALES - GENERAL: PAINLEVEL_OUTOF10: NO PAIN (0)

## 2025-07-24 NOTE — NURSING NOTE
"Chief Complaint   Patient presents with    Follow Up     3 month F/U - prostate cancer         Initial /68 (BP Location: Right arm, Patient Position: Sitting, Cuff Size: Adult Regular)   Pulse 87   Temp 99  F (37.2  C) (Temporal)   Resp 16   Wt 96.2 kg (212 lb)   SpO2 94%   BMI 27.97 kg/m   Estimated body mass index is 27.97 kg/m  as calculated from the following:    Height as of 4/18/25: 1.854 m (6' 1\").    Weight as of this encounter: 96.2 kg (212 lb).    Medication Review: complete    The next two questions are to help us understand your food security.  If you are feeling you need any assistance in this area, we have resources available to support you today.          11/17/2023   SDOH- Food Insecurity   Within the past 12 months, did you worry that your food would run out before you got money to buy more? N    Within the past 12 months, did the food you bought just not last and you didn t have money to get more? N         Proxy-reported    Data saved with a previous flowsheet row definition         Health Care Directive:  Patient has a Health Care Directive on file      Moni Kilgore LPN      "

## 2025-07-24 NOTE — PROGRESS NOTES
Oncology Follow-up Visit    Reason for Visit:  Colten is an 85 year old man with a diagnosis of prostate cancer, who presents to the clinic today for routine follow-up.    Nursing Note and documentation reviewed: Yes    Provider location: [facility]    Interval History: Colten notes that he is doing well. No big concerns today. Notes that he was a little under the weather these last few days but finally feeling a little better today. Just felt incredibly sluggish. Felt he had a little bug. No fever, cough, or SOB.     He denies bleeding concerns. No nausea or vomiting. Eating alright, a little less these last few days. Energy low but better today.     Oncologic History:   Dr. 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 Patient was diagnosed with [place] 3+4 equal 7 prostate cancer back in 2013 after he presented with a PSA of 8.26.  Elected to receive external beam radiotherapy and received 7700 cGy to the prostate and seminal vesicles completed on 4/15/2013.  Radiation was given at [facility] [place].  According the patient his PSA dropped to 0 subsequently.     He was followed by Dr. Moore who performed cystoscopy for gross hematuria in January 2023.  The findings with prior TURP defect was identified with some moderate prostate regrowth otherwise the bladder appeared to be normal capacity there were no tumors stones or foreign bodies.  His hematuria eventually resolved.  His PSA at that time was 2.35. Dr. Moore left the practice.      The patient was seen by his primary care physician on 09/05/2024 who ordered a PSA and it was elevated at 9.36. He was referred to urology and was seen by Dr. Abraham Reina on 10/1/2024 and he recommended PET PSMA Pylarify scan.  Given his history of gross hematuria he also recommended urine for cytology.  This was obtained and came back negative. PET  scan was performed on 2025 and the findings were that there was intensely increased uptake within sclerotic lesion in the posterior aspect of the left eighth rib compatible with osseous metastatic disease.  The prostate was enlarged with asymmetric intensely increased uptake right more than left.  Dr. Reina felt that this was all consistent with recurrent metastatic castrate sensitive prostate cancer.      He felt a biopsy was not necessary to confirm this.  Patient saw Dr. Herrera to discuss treatment options. 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 daily, Lupron 22.5 mg IM every 3 months as well as Xgeva 120 mg subcutaneously monthly.      He commenced therapy 2024.      Current Chemo Regimen/TX: Xtandi 160 mg PO daily with Lupron 22.5 mg q3m and Xgeva 120 mg q28 days      Previous treatment:   TURP in   Radiation in     Past Medical History:   Diagnosis Date    Benign neoplasm of colon     30 centimeters that returned as tubular adenoma with low-grade dysplasia.    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       Past Surgical History:   Procedure Laterality Date    BIOPSY      biopsy of prostate- needle/punch    COLONOSCOPY  2018    Dr. Humphreys, [facility]    GENITOURINARY SURGERY  2012    TURP    GENITOURINARY SURGERY      cystoscopy    OTHER SURGICAL HISTORY      ,[id number],([place]) FACILITY COLORECTAL CA SCREEN FLEX SCOPE       Family History   Problem Relation Age of Onset    Prostate Cancer Father     Cerebrovascular Disease Father     Breast Cancer Mother 49        Cancer-breast,    Other - See Comments Other         He had nine siblings-four sisters and two  brothers still living.  Three -one from a motor vehicle accident, one from a stroke, one from a crib death.    Other - See Comments Brother         aneurysm       Social History     Socioeconomic History    Marital status:      Spouse name: Not on file    Number of children: Not on file    Years of education: Not on file    Highest education level: Not on file   Occupational History    Not on file   Tobacco Use    Smoking status: Never     Passive exposure: Never    Smokeless tobacco: Never   Vaping Use    Vaping status: Never Used   Substance and Sexual Activity    Alcohol use: Yes     Comment: 3 per week    Drug use: Never    Sexual activity: Not Currently   Other Topics Concern    Parent/sibling w/ CABG, MI or angioplasty before 65F 55M? Not Asked   Social History Narrative    .  Retired from [employer] at age 58; he was a .  He has one daughter, Yandy, recently .  Enjoys snowmobiling and outdoor activities    Preloaded 10/29/13     Social Drivers of Health     Financial Resource Strain: Low Risk  (2023)    Financial Resource Strain     Within the past 12 months, have you or your family members you live with been unable to get utilities (heat, electricity) when it was really needed?: No   Food Insecurity: No Food Insecurity (2024)    Received from [facility]    Hunger Vital Sign     Worried About Running Out of Food in the Last Year: Never true     Ran Out of Food in the Last Year: Never true   Transportation Needs: No Transportation Needs (2024)    Received from [facility] and [facility]    PRAPARE - Transportation     Lack of Transportation (Medical): No     Lack of Transportation (Non-Medical): No   Physical Activity: Not on file   Stress: Not on file   Social Connections: Not on file   Interpersonal Safety: Low Risk  (2025)    Interpersonal Safety     Do you feel physically and  emotionally safe where you currently live?: Yes     Within the past 12 months, have you been hit, slapped, kicked or otherwise physically hurt by someone?: No     Within the past 12 months, have you been humiliated or emotionally abused in other ways by your partner or ex-partner?: No   Housing Stability: Unknown (9/5/2024)    [facility]    Housing Stability Vital Sign     Unable to Pay for Housing in the Last Year: No     Number of Times Moved in the Last Year: Not on file     Homeless in the Last Year: No       Current Outpatient Medications   Medication Sig Dispense Refill    APIXABAN PO Take 5 mg by mouth daily       Ascorbic Acid (VITAMIN C PO) Take 1 tablet by mouth daily.      Calcium-Magnesium-Zinc 333-133-5 MG TABS per tablet Take 2 tablets by mouth daily.      dorzolamide-timolol (COSOPT) 2-0.5 % ophthalmic solution Place 1 drop Into the left eye 2 times daily.      [START ON 7/29/2025] enzalutamide (XTANDI) 80 MG tablet Take 2 tablets (160 mg) by mouth daily. 60 tablet 0    enzalutamide (XTANDI) 80 MG tablet Take 2 tablets (160 mg) by mouth daily. 60 tablet 0    enzalutamide (XTANDI) 80 MG tablet Take 2 tablets (160 mg) by mouth daily. 60 tablet 0    hydrochlorothiazide (HYDRODIURIL) 25 MG tablet Take 25 mg by mouth daily.      lisinopril (ZESTRIL) 40 MG tablet 40 mg.      Multiple Vitamins-Minerals (ONE DAILY MULTIVITAMIN MEN) TABS Take 1 tablet by mouth daily.      Multiple Vitamins-Minerals (PRESERVISION AREDS 2 PO) Take 1 capsule by mouth daily.      Multiple Vitamins-Minerals (ZINC PO) Take 1 tablet by mouth daily.      prochlorperazine (COMPAZINE) 10 MG tablet Take 1 tablet (10 mg) by mouth every 6 hours as needed for nausea or vomiting. (Patient not taking: Reported on 7/18/2025) 30 tablet 2    triamcinolone (KENALOG) 0.1 % external cream Apply topically 2 times daily as needed for irritation. (Patient not taking: Reported on 7/18/2025) 45 g 1      Current Facility-Administered Medications   Medication Dose Route Frequency Provider Last Rate Last Admin    leuprolide (LUPRON DEPOT) kit 22.5 mg  22.5 mg Intramuscular Q90 Days Abraham Reina MD   22.5 mg at 05/16/25 0931        No Known Allergies    Review Of Systems:  A complete review of systems is negative except for the above mentioned items in the interval history.     ECOG Performance Status: 0    Physical Exam:  /68 (BP Location: Right arm, Patient Position: Sitting, Cuff Size: Adult Regular)   Pulse 87   Temp 99  F (37.2  C) (Temporal)   Resp 16   Wt 96.2 kg (212 lb)   SpO2 94%   BMI 27.97 kg/m    GENERAL APPEARANCE: Healthy, alert and in no acute distress.  HEENT: Eyes appear normal without scleral icterus. Extraocular movements intact.   NECK:   Supple with normal range of motion. No asymmetry or masses.  LYMPHATICS: No palpable cervical, supraclavicular nodes.  RESP: Lungs clear to auscultation bilaterally, respirations regular and easy.  CARDIOVASCULAR: Regular rate and rhythm. Normal S1, S2; no murmur, gallop, or rub.  MUSCULOSKELETAL: Extremities without gross deformities noted. No edema of bilateral lower extremities.  SKIN: No suspicious lesions or rashes.  NEURO: Alert and oriented x 3.  Gait steady.  PSYCHIATRIC: Mentation and affect appear normal.  Mood appropriate.    Laboratory:  Results for orders placed or performed in visit on 07/24/25   CBC with platelets and differential     Status: Abnormal   Result Value Ref Range    WBC Count 2.0 (L) 4.0 - 11.0 10e3/uL    RBC Count 3.90 (L) 4.40 - 5.90 10e6/uL    Hemoglobin 11.9 (L) 13.3 - 17.7 g/dL    Hematocrit 33.9 (L) 40.0 - 53.0 %    MCV 87 78 - 100 fL    MCH 30.5 26.5 - 33.0 pg    MCHC 35.1 31.5 - 36.5 g/dL    RDW 12.5 10.0 - 15.0 %    Platelet Count 118 (L) 150 - 450 10e3/uL    % Neutrophils 48 %    % Lymphocytes 31 %    % Monocytes 18 %    % Eosinophils 1 %    % Basophils 2 %    % Immature Granulocytes 1 %    NRBCs per 100 WBC  0 <1 /100    Absolute Neutrophils 1.0 (L) 1.6 - 8.3 10e3/uL    Absolute Lymphocytes 0.6 (L) 0.8 - 5.3 10e3/uL    Absolute Monocytes 0.4 0.0 - 1.3 10e3/uL    Absolute Eosinophils 0.0 0.0 - 0.7 10e3/uL    Absolute Basophils 0.0 0.0 - 0.2 10e3/uL    Absolute Immature Granulocytes 0.0 <=0.4 10e3/uL    Absolute NRBCs 0.0 10e3/uL   CBC with Platelets & Differential     Status: Abnormal    Narrative    The following orders were created for panel order CBC with Platelets & Differential.  Procedure                               Abnormality         Status                     ---------                               -----------         ------                     CBC with platelets and ...[[id number]]  Abnormal            Final result                 Please view results for these tests on the individual orders.     Component      Latest Ref [facility] 7/18/2025  8:24 AM   Sodium      135 - 145 mmol/L 138    Potassium      3.4 - 5.3 mmol/L 4.6    Carbon Dioxide (CO2)      22 - 29 mmol/L 25    Anion Gap      7 - 15 mmol/L 10    Urea Nitrogen      8.0 - 23.0 mg/dL 24.4 (H)    Creatinine      0.67 - 1.17 mg/dL 1.32 (H)    GFR Estimate      >60 mL/min/1.73m2 54 (L)    Calcium      8.8 - 10.4 mg/dL 9.2    Calcium      8.8 - 10.4 mg/dL 9.2    Chloride      98 - 107 mmol/L 103    Glucose      70 - 99 mg/dL 129 (H)    Alkaline Phosphatase      40 - 150 U/L 58    AST      0 - 45 U/L 22    ALT      0 - 70 U/L 14    Protein Total      6.4 - 8.3 g/dL 6.7    Albumin      3.5 - 5.2 g/dL 3.8    Bilirubin Total      <=1.2 mg/dL 0.6    Phosphorus      2.5 - 4.5 mg/dL 4.0       Legend:  (H) High  (L) Low    Imaging Studies:    None today    ASSESSMENT/PLAN:    #1 Recurrent metastatic castrate sensitive prostate cancer with bone metastasis   Patient with previous history of [place] 4+3 equal 7 prostate cancer treated with EBRT in 2013 with subsequent rising PSA.PET PSMA 11/2024 scan indicating left posterior eighth rib metastases as well as local  recurrence of prostate cancer.  Patient was deemed to be a candidate for enzalutamide/Lupron/Xgeva which he commenced 12/2024. His PSA has repsonded nicely.     Today, he is doing well. Felt a little under the weather these last few days but better today. His labs do show a drop in WBC and platelet but okay to proceed with treatment. He received Xgeva a couple weeks ago, will continue that monthly. Due for Lupron next in a couple weeks. We will continue Xtandi. I will check labs monthly, and keep an eye on CBC.     Todays PSA pending but will call (and leave voicemail per patient) with results. Will follow-up in 3 months on a day he is also due for Xgeva. Sooner with concerns.     #2 Bone mets Continue Xgeva monthly. Continue calcium and D and weight bearing activity encouraged.     #3 Pancytopenia A little under the weather these last few days. Xtandi likely contributing. Will recheck CBC next month. Will also add on iron labs to ensure supplementation wouldn't help with hgb.     Patient in agreement with plan and verbalizes understanding. Agrees to call with any questions or concerns.    45 minutes spent in the patient's encounter today with time spent in review of patient's chart along with chart preparation and review of the treatment plan and signing of treatment plan.  Time was also spent with the patient in obtaining a review of systems and performing a physical exam along with detailed review of all test results. Time was also spent in discussing plan for future follow-up and relating instructions for follow-up and in placing future orders.    [facility]  Medical Oncology

## 2025-07-27 LAB — STFR SERPL-MCNC: 2.7 MG/L

## 2025-08-11 ENCOUNTER — ALLIED HEALTH/NURSE VISIT (OUTPATIENT)
Dept: UROLOGY | Facility: OTHER | Age: 85
End: 2025-08-11
Attending: UROLOGY
Payer: MEDICARE

## 2025-08-11 DIAGNOSIS — C61 METASTATIC CASTRATION-SENSITIVE ADENOCARCINOMA OF PROSTATE (H): Primary | ICD-10-CM

## 2025-08-11 DIAGNOSIS — Z19.1 METASTATIC CASTRATION-SENSITIVE ADENOCARCINOMA OF PROSTATE (H): Primary | ICD-10-CM

## 2025-08-11 PROCEDURE — 250N000011 HC RX IP 250 OP 636: Mod: JZ | Performed by: UROLOGY

## 2025-08-11 PROCEDURE — 96402 CHEMO HORMON ANTINEOPL SQ/IM: CPT

## 2025-08-11 PROCEDURE — 99207 PR NO CHARGE NURSE ONLY: CPT

## 2025-08-11 RX ADMIN — LEUPROLIDE ACETATE 22.5 MG: KIT at 08:39

## 2025-08-13 DIAGNOSIS — C61 METASTATIC CASTRATION-SENSITIVE ADENOCARCINOMA OF PROSTATE (H): Primary | ICD-10-CM

## 2025-08-13 DIAGNOSIS — Z19.1 METASTATIC CASTRATION-SENSITIVE ADENOCARCINOMA OF PROSTATE (H): Primary | ICD-10-CM

## 2025-08-19 ENCOUNTER — LAB (OUTPATIENT)
Dept: LAB | Facility: OTHER | Age: 85
End: 2025-08-19
Payer: MEDICARE

## 2025-08-19 ENCOUNTER — HOSPITAL ENCOUNTER (OUTPATIENT)
Dept: INFUSION THERAPY | Facility: OTHER | Age: 85
Discharge: HOME | End: 2025-08-19
Attending: NURSE PRACTITIONER
Payer: MEDICARE

## 2025-08-19 VITALS
HEART RATE: 74 BPM | DIASTOLIC BLOOD PRESSURE: 5 MMHG | TEMPERATURE: 97.3 F | RESPIRATION RATE: 18 BRPM | SYSTOLIC BLOOD PRESSURE: 131 MMHG

## 2025-08-19 DIAGNOSIS — C61 METASTATIC CASTRATION-SENSITIVE ADENOCARCINOMA OF PROSTATE (H): ICD-10-CM

## 2025-08-19 DIAGNOSIS — Z19.1 METASTATIC CASTRATION-SENSITIVE ADENOCARCINOMA OF PROSTATE (H): ICD-10-CM

## 2025-08-19 DIAGNOSIS — C61 METASTATIC CASTRATION-SENSITIVE ADENOCARCINOMA OF PROSTATE (H): Primary | ICD-10-CM

## 2025-08-19 DIAGNOSIS — Z19.1 METASTATIC CASTRATION-SENSITIVE ADENOCARCINOMA OF PROSTATE (H): Primary | ICD-10-CM

## 2025-08-19 LAB
ALBUMIN SERPL BCG-MCNC: 3.7 G/DL (ref 3.5–5.2)
ALBUMIN SERPL BCG-MCNC: 3.8 G/DL (ref 3.5–5.2)
ALP SERPL-CCNC: 65 U/L (ref 40–150)
ALT SERPL W P-5'-P-CCNC: 13 U/L (ref 0–70)
ANION GAP SERPL CALCULATED.3IONS-SCNC: 13 MMOL/L (ref 7–15)
AST SERPL W P-5'-P-CCNC: 25 U/L (ref 0–45)
BASOPHILS # BLD AUTO: 0.06 10E3/UL (ref 0–0.2)
BASOPHILS NFR BLD AUTO: 1.3 %
BILIRUB SERPL-MCNC: 0.5 MG/DL
BUN SERPL-MCNC: 24.3 MG/DL (ref 8–23)
CALCIUM SERPL-MCNC: 8.8 MG/DL (ref 8.8–10.4)
CALCIUM SERPL-MCNC: 8.8 MG/DL (ref 8.8–10.4)
CHLORIDE SERPL-SCNC: 101 MMOL/L (ref 98–107)
CREAT SERPL-MCNC: 1.15 MG/DL (ref 0.67–1.17)
CREAT SERPL-MCNC: 1.18 MG/DL (ref 0.67–1.17)
EGFRCR SERPLBLD CKD-EPI 2021: 61 ML/MIN/1.73M2
EGFRCR SERPLBLD CKD-EPI 2021: 63 ML/MIN/1.73M2
EOSINOPHIL # BLD AUTO: 0.46 10E3/UL (ref 0–0.7)
EOSINOPHIL NFR BLD AUTO: 10.3 %
ERYTHROCYTE [DISTWIDTH] IN BLOOD BY AUTOMATED COUNT: 12.9 % (ref 10–15)
GLUCOSE SERPL-MCNC: 127 MG/DL (ref 70–99)
HCO3 SERPL-SCNC: 21 MMOL/L (ref 22–29)
HCT VFR BLD AUTO: 34.1 % (ref 40–53)
HGB BLD-MCNC: 11.4 G/DL (ref 13.3–17.7)
IMM GRANULOCYTES # BLD: 0.04 10E3/UL
IMM GRANULOCYTES NFR BLD: 0.9 %
LYMPHOCYTES # BLD AUTO: 1.24 10E3/UL (ref 0.8–5.3)
LYMPHOCYTES NFR BLD AUTO: 27.7 %
MCH RBC QN AUTO: 30 PG (ref 26.5–33)
MCHC RBC AUTO-ENTMCNC: 33.4 G/DL (ref 31.5–36.5)
MCV RBC AUTO: 89.7 FL (ref 78–100)
MONOCYTES # BLD AUTO: 0.41 10E3/UL (ref 0–1.3)
MONOCYTES NFR BLD AUTO: 9.2 %
NEUTROPHILS # BLD AUTO: 2.27 10E3/UL (ref 1.6–8.3)
NEUTROPHILS NFR BLD AUTO: 50.6 %
NRBC # BLD AUTO: <0.03 10E3/UL
NRBC BLD AUTO-RTO: 0 /100
PHOSPHATE SERPL-MCNC: 2.7 MG/DL (ref 2.5–4.5)
PLATELET # BLD AUTO: 171 10E3/UL (ref 150–450)
POTASSIUM SERPL-SCNC: 4.5 MMOL/L (ref 3.4–5.3)
PROT SERPL-MCNC: 6.9 G/DL (ref 6.4–8.3)
PSA SERPL DL<=0.01 NG/ML-MCNC: 0.16 NG/ML
RBC # BLD AUTO: 3.8 10E6/UL (ref 4.4–5.9)
SODIUM SERPL-SCNC: 135 MMOL/L (ref 135–145)
WBC # BLD AUTO: 4.48 10E3/UL (ref 4–11)

## 2025-08-19 PROCEDURE — 84153 ASSAY OF PSA TOTAL: CPT | Mod: ZL

## 2025-08-19 PROCEDURE — 82310 ASSAY OF CALCIUM: CPT | Mod: ZL

## 2025-08-19 PROCEDURE — 82310 ASSAY OF CALCIUM: CPT | Performed by: NURSE PRACTITIONER

## 2025-08-19 PROCEDURE — 82565 ASSAY OF CREATININE: CPT | Performed by: NURSE PRACTITIONER

## 2025-08-19 PROCEDURE — 82040 ASSAY OF SERUM ALBUMIN: CPT | Performed by: NURSE PRACTITIONER

## 2025-08-19 PROCEDURE — 85004 AUTOMATED DIFF WBC COUNT: CPT | Mod: ZL

## 2025-08-19 PROCEDURE — 250N000011 HC RX IP 250 OP 636: Mod: JZ | Performed by: NURSE PRACTITIONER

## 2025-08-19 PROCEDURE — 84100 ASSAY OF PHOSPHORUS: CPT | Performed by: NURSE PRACTITIONER

## 2025-08-19 PROCEDURE — 36415 COLL VENOUS BLD VENIPUNCTURE: CPT | Mod: ZL

## 2025-08-19 PROCEDURE — 96372 THER/PROPH/DIAG INJ SC/IM: CPT | Performed by: NURSE PRACTITIONER

## 2025-08-19 RX ORDER — MEPERIDINE HYDROCHLORIDE 25 MG/ML
25 INJECTION INTRAMUSCULAR; INTRAVENOUS; SUBCUTANEOUS
OUTPATIENT
Start: 2025-09-16

## 2025-08-19 RX ORDER — DIPHENHYDRAMINE HYDROCHLORIDE 50 MG/ML
50 INJECTION, SOLUTION INTRAMUSCULAR; INTRAVENOUS
Start: 2025-09-16

## 2025-08-19 RX ORDER — ALBUTEROL SULFATE 90 UG/1
1-2 INHALANT RESPIRATORY (INHALATION)
Start: 2025-09-16

## 2025-08-19 RX ORDER — EPINEPHRINE 1 MG/ML
0.3 INJECTION, SOLUTION, CONCENTRATE INTRAVENOUS EVERY 5 MIN PRN
OUTPATIENT
Start: 2025-09-16

## 2025-08-19 RX ORDER — ALBUTEROL SULFATE 0.83 MG/ML
2.5 SOLUTION RESPIRATORY (INHALATION)
OUTPATIENT
Start: 2025-09-16

## 2025-08-19 RX ORDER — DIPHENHYDRAMINE HYDROCHLORIDE 50 MG/ML
25 INJECTION, SOLUTION INTRAMUSCULAR; INTRAVENOUS
Start: 2025-09-16

## 2025-08-19 RX ORDER — METHYLPREDNISOLONE SODIUM SUCCINATE 40 MG/ML
40 INJECTION INTRAMUSCULAR; INTRAVENOUS
Start: 2025-09-16

## 2025-08-19 RX ADMIN — DENOSUMAB 120 MG: 120 INJECTION SUBCUTANEOUS at 14:14

## 2025-08-20 DIAGNOSIS — C61 METASTATIC CASTRATION-SENSITIVE ADENOCARCINOMA OF PROSTATE (H): Primary | ICD-10-CM

## 2025-08-20 DIAGNOSIS — Z19.1 METASTATIC CASTRATION-SENSITIVE ADENOCARCINOMA OF PROSTATE (H): Primary | ICD-10-CM
